# Patient Record
Sex: FEMALE | Race: WHITE | NOT HISPANIC OR LATINO | Employment: FULL TIME | URBAN - METROPOLITAN AREA
[De-identification: names, ages, dates, MRNs, and addresses within clinical notes are randomized per-mention and may not be internally consistent; named-entity substitution may affect disease eponyms.]

---

## 2017-08-14 ENCOUNTER — TRANSCRIBE ORDERS (OUTPATIENT)
Dept: ADMINISTRATIVE | Facility: HOSPITAL | Age: 42
End: 2017-08-14

## 2017-08-14 DIAGNOSIS — Z12.31 VISIT FOR SCREENING MAMMOGRAM: Primary | ICD-10-CM

## 2017-08-25 ENCOUNTER — APPOINTMENT (OUTPATIENT)
Dept: OTHER | Facility: HOSPITAL | Age: 42
End: 2017-08-25
Attending: OBSTETRICS & GYNECOLOGY

## 2017-08-25 ENCOUNTER — HOSPITAL ENCOUNTER (OUTPATIENT)
Dept: MAMMOGRAPHY | Facility: HOSPITAL | Age: 42
Discharge: HOME | End: 2017-08-25
Attending: OBSTETRICS & GYNECOLOGY | Admitting: OBSTETRICS & GYNECOLOGY

## 2017-08-25 DIAGNOSIS — Z12.31 VISIT FOR SCREENING MAMMOGRAM: ICD-10-CM

## 2017-08-25 DIAGNOSIS — Z92.89 HISTORY OF MAMMOGRAM: ICD-10-CM

## 2017-08-25 PROCEDURE — 77063 BREAST TOMOSYNTHESIS BI: CPT

## 2017-08-25 PROCEDURE — 77067 SCR MAMMO BI INCL CAD: CPT | Performed by: RADIOLOGY

## 2017-08-25 PROCEDURE — G0202 SCR MAMMO BI INCL CAD: HCPCS

## 2017-08-25 PROCEDURE — 77063 BREAST TOMOSYNTHESIS BI: CPT | Performed by: RADIOLOGY

## 2017-09-06 ENCOUNTER — CLINICAL SUPPORT (OUTPATIENT)
Dept: GENETICS | Facility: HOSPITAL | Age: 42
End: 2017-09-06

## 2017-09-06 ENCOUNTER — LAB (OUTPATIENT)
Dept: LAB | Facility: HOSPITAL | Age: 42
End: 2017-09-06

## 2017-09-06 DIAGNOSIS — Z80.42 FAMILY HISTORY OF PROSTATE CANCER: ICD-10-CM

## 2017-09-06 DIAGNOSIS — Z80.41 FAMILY HISTORY OF OVARIAN CANCER: ICD-10-CM

## 2017-09-06 DIAGNOSIS — Z80.3 FAMILY HISTORY OF BREAST CANCER: Primary | ICD-10-CM

## 2017-09-06 DIAGNOSIS — Z80.8 FAMILY HISTORY OF MELANOMA: ICD-10-CM

## 2017-09-06 DIAGNOSIS — R92.2 DENSE BREAST: Primary | ICD-10-CM

## 2017-09-06 NOTE — PROGRESS NOTES
Marah Millard is a 42 year old female who was referred for genetic counseling due to a family history of breast cancer. Ms. Millard has no personal history of cancer. She was 12 years old at menarche and had her first child at age 31. She retains her uterus and ovaries. Her current cancer screening includes annual clinical breast exam and annual mammogram. She was interested in discussing her risk for a hereditary cancer syndrome.  Ms. Millard was interested in pursuing a multi-gene panel, and therefore the CancerNext panel was ordered through [facility] [employer] which analyzes 34 genes associated with an increased cancer risk. The genes on this panel include APC, DANA, BARD1, BMPR1A, BRCA1, BRCA2, BRIP1, CDH1, CDK4, CDKN2A, CHEK2, DICER1, EPCAM, GREM1, HOXB13, MLH1, MRE11A, MSH2, MSH6, MUTYH, NBN, NF1, PALB2, PMS2, POLD1, POLE, PTEN, RAD50, RAD51C, RAD51D, SMAD4, SMARCA4, STK11, and TP53. Results from this testing are expected in approximately 2-3 weeks.    FAMILY HISTORY (see attached pedigree):    Mat. Grandmother:  Breast cancer, 50s  Mat. Great Grandmother: Ovarian cancer, 40s  Father:    Prostate cancer, 62  Mat. Grandfather:  Melanoma, 50s    We do not have medical records confirming the diagnoses in Ms. Millard’s family.    RISK ASSESSMENT:  Ms. Millard’s family history of breast cancer led to concern regarding a hereditary cancer syndrome.  She clearly meets NCCN guidelines criteria for BRCA1/2 testing based on family history. In cases where an affected relative is not available for testing or not willing to pursue testing, it is appropriate to offer testing to an unaffected individual.  Ms. Millard opted to pursue multigene panel testing via the CancerNext panel. If genetic testing is negative, Ms. Millard’s management should be guided by family history. These risk assessments are based on the family history information provided at the time of the appointment.  The assessments could change in the  future should new information be obtained.    GENETIC COUNSELING (30 minutes):  We reviewed the family history information in detail. Cases of cancer follow three general patterns: sporadic, familial, and hereditary.  While most cancer is sporadic, some cases appear to occur in family clusters.  These cases are said to be familial and account for 10-20% of cancer cases.  Familial cases may be due to a combination of shared genes and environmental factors among family members.  In even fewer families, the cancer is said to be inherited, and the genes responsible for the cancer are known.      Family histories typical of hereditary cancer syndromes usually include multiple first- and second-degree relatives diagnosed with cancer types that define a syndrome.  These cases tend to be diagnosed at younger-than-expected ages and can be bilateral or multifocal.  The cancer in these families follows an autosomal dominant inheritance pattern, which indicates the likely presence of a mutation in a cancer susceptibility gene.  Children and siblings of an individual believed to carry this mutation have a 50% chance of inheriting that mutation, thereby inheriting the increased risk to develop cancer.  These mutations can be passed down from the maternal or the paternal lineage.    Based on Ms. Millard’s family history of breast cancer, we discussed that hereditary breast cancer accounts for 5-10% of all cases of breast cancer.  A significant proportion of hereditary breast cancer can be attributed to mutations in the BRCA1 and BRCA2 genes.  Mutations in these genes confer an increased risk for breast cancer, ovarian cancer, male breast cancer, prostate cancer, and pancreatic cancer.  Women with a BRCA1 or BRCA2 mutation have up to an 87% lifetime risk of breast cancer and up to a 44% risk of ovarian cancer.     We discussed that there are other, more rare, hereditary cancer syndromes. Some of these conditions have well defined  cancer risks and established management guidelines.  Other genes that can be tested for have been more recently described, and there may be less data regarding the risks and therefore may not have established management guidelines.  We discussed these limitations at length. Based on Ms. Millard’s family history of breast cancer and her desire to get more information regarding her personal risks she opted to pursue testing through a panel evaluating several other genes known to increase the risk for cancer.    GENETIC TESTING:  The risks, benefits and limitations of genetic testing and implications for clinical management following testing were reviewed. DNA test results can influence decisions regarding screening and prevention.  Genetic testing can have significant psychological implications for both individuals and families. Also discussed was the possibility of employment and insurance discrimination based on genetic test results and the federal and states laws that are in place to prevent this.         We discussed panel testing, which would involve testing 34 genes associated with increased cancer risk. The benefits and limitations of genetic testing were discussed.  The implications of a positive or negative test result were discussed.  We also discussed the importance of testing on an affected relative. We discussed the possibility that, in some cases, genetic test results may be ambiguous due to the identification of a genetic variant. These variants may or may not be associated with an increased cancer risk. With multigene panel testing, it is not uncommon for a variant of uncertain significance (VUS) to be identified.  If a VUS is identified, testing family members is not recommended and screening recommendations are made based on the family history.  The laboratories that perform genetic testing work to reclassify the VUS and send out an amended report if and when a VUS is reclassified.  The majority of  variant findings are ultimately reclassified to a negative result. Given her family history, a negative test result does not eliminate all cancer risk, although the risk would not be as high as it would with positive genetic testing. We also discussed that some of the genes on this particular panel have not been well studied yet and there may not be clear implications or guidelines for some of the genes included on this comprehensive panel.    PLAN:  Genetic testing via the CancerNext panel through [facility] was ordered and results are expected in 2-3 weeks. We will contact Ms. Millard with her results once they are received.    Keren De La Garza MS  Genetic Counselor

## 2017-09-20 ENCOUNTER — DOCUMENTATION (OUTPATIENT)
Dept: GENETICS | Facility: HOSPITAL | Age: 42
End: 2017-09-20

## 2017-09-20 NOTE — PROGRESS NOTES
Marah Millard is a 42 year old female who was referred for genetic counseling due to a family history of breast cancer. Ms. Millard has no personal history of cancer. She was 12 years old at menarche and had her first child at age 31. She retains her uterus and ovaries. Her current cancer screening includes annual clinical breast exam and annual mammogram. She was interested in discussing her risk for a hereditary cancer syndrome.  Ms. Millard was interested in pursuing a multi-gene panel, and therefore the CancerNext panel was ordered through [facility] which analyzes 34 genes associated with an increased cancer risk. The genes on this panel include APC, DANA, BARD1, BMPR1A, BRCA1, BRCA2, BRIP1, CDH1, CDK4, CDKN2A, CHEK2, DICER1, EPCAM, GREM1, HOXB13, MLH1, MRE11A, MSH2, MSH6, MUTYH, NBN, NF1, PALB2, PMS2, POLD1, POLE, PTEN, RAD50, RAD51C, RAD51D, SMAD4, SMARCA4, STK11, and TP53. Genetic testing was negative by sequencing and rearrangement testing for deleterious mutations in the 34 genes included on the CancerNext panel (see attached results). These normal results were discussed with Ms. Millard by telephone on 9/19/17.    FAMILY HISTORY (see attached pedigree):    Mat. Grandmother:  Breast cancer, 50s  Mat. Great Grandmother: Ovarian cancer, 40s  Father:    Prostate cancer, 62  Pat. Grandfather:  Melanoma, 50s    We do not have medical records confirming the diagnoses in Ms. Millard’s family.    RISK ASSESSMENT:  Ms. Millard’s family history of breast cancer led to concern regarding a hereditary cancer syndrome.  She clearly meets NCCN guidelines criteria for BRCA1/2 testing based on family history. In cases where an affected relative is not available for testing or not willing to pursue testing, it is appropriate to offer testing to an unaffected individual.  Ms. Millard opted to pursue multigene panel testing via the CancerNext panel. If genetic testing is negative, Ms. Millard’s management should  be guided by family history.    This testing was negative for deleterious mutations in Ms. Millard, greatly reducing the likelihood for her to have a hereditary cancer syndrome. Based on computer modeling, Ms. Millard’s lifetime risk for breast cancer was estimated to be 16.9% (ERIKA), elevated over the general population risk of 12.5%.  Per NCCN guidelines, a risk greater than 20% is considered high risk and warrants increased screening, Ms. Millard’s risk does not fall into this category.  This risk assessment is based on the information that was provided during the session and may change if new information is obtained.    GENETIC COUNSELING (30 minutes):  We reviewed the family history information in detail. Cases of cancer follow three general patterns: sporadic, familial, and hereditary.  While most cancer is sporadic, some cases appear to occur in family clusters.  These cases are said to be familial and account for 10-20% of cancer cases.  Familial cases may be due to a combination of shared genes and environmental factors among family members.  In even fewer families, the cancer is said to be inherited, and the genes responsible for the cancer are known.      Family histories typical of hereditary cancer syndromes usually include multiple first- and second-degree relatives diagnosed with cancer types that define a syndrome.  These cases tend to be diagnosed at younger-than-expected ages and can be bilateral or multifocal.  The cancer in these families follows an autosomal dominant inheritance pattern, which indicates the likely presence of a mutation in a cancer susceptibility gene.  Children and siblings of an individual believed to carry this mutation have a 50% chance of inheriting that mutation, thereby inheriting the increased risk to develop cancer.  These mutations can be passed down from the maternal or the paternal lineage.    Based on Ms. Millard’s family history of breast cancer, we discussed  that hereditary breast cancer accounts for 5-10% of all cases of breast cancer.  A significant proportion of hereditary breast cancer can be attributed to mutations in the BRCA1 and BRCA2 genes.  Mutations in these genes confer an increased risk for breast cancer, ovarian cancer, male breast cancer, prostate cancer, and pancreatic cancer.  Women with a BRCA1 or BRCA2 mutation have up to an 87% lifetime risk of breast cancer and up to a 44% risk of ovarian cancer.     We discussed that there are other, more rare, hereditary cancer syndromes. Some of these conditions have well defined cancer risks and established management guidelines.  Other genes that can be tested for have been more recently described, and there may be less data regarding the risks and therefore may not have established management guidelines.  We discussed these limitations at length. Based on Ms. Millard’s family history of breast cancer and her desire to get more information regarding her personal risks she opted to pursue testing through a panel evaluating several other genes known to increase the risk for cancer.    GENETIC TESTING:  The risks, benefits and limitations of genetic testing and implications for clinical management following testing were reviewed. DNA test results can influence decisions regarding screening and prevention.  Genetic testing can have significant psychological implications for both individuals and families. Also discussed was the possibility of employment and insurance discrimination based on genetic test results and the federal and states laws that are in place to prevent this.         We discussed panel testing, which would involve testing 34 genes associated with increased cancer risk. The benefits and limitations of genetic testing were discussed.  The implications of a positive or negative test result were discussed.  We also discussed the importance of testing on an affected relative. We discussed the  possibility that, in some cases, genetic test results may be ambiguous due to the identification of a genetic variant. These variants may or may not be associated with an increased cancer risk. With multigene panel testing, it is not uncommon for a variant of uncertain significance (VUS) to be identified.  If a VUS is identified, testing family members is not recommended and screening recommendations are made based on the family history.  The laboratories that perform genetic testing work to reclassify the VUS and send out an amended report if and when a VUS is reclassified.  The majority of variant findings are ultimately reclassified to a negative result. Given her family history, a negative test result does not eliminate all cancer risk, although the risk would not be as high as it would with positive genetic testing. We also discussed that some of the genes on this particular panel have not been well studied yet and there may not be clear implications or guidelines for some of the genes included on this comprehensive panel.    TEST RESULTS:  Genetic testing was negative by sequencing and rearrangement testing for the 34 genes on this panel.  The negative result greatly lowers the risk of a hereditary cancer syndrome for Ms. Millard.  Since an affected individual in the family has not had testing, it is possible that the family history is due to a hereditary cancer syndrome which Ms. Millard did not happen to inherit. This assessment is based on the information provided at the time of the consultation.    CANCER PREVENTION:  Options available to individuals with an elevated lifetime risk for breast and/or ovarian cancer were briefly discussed, including increased surveillance, chemoprevention and prophylactic surgery (mastectomy and/or oophorectomy).  Based on computer modeling, Ms. Millard’s lifetime risk for breast cancer would not be considered “high risk”.  She should follow general population screening  guidelines for her breast cancer risk, including annual clinical breast exam, annual mammography, and monthly self-breast exam.      PLAN:  Genetic counseling remains available for Ms. Millard.  If Ms. Millard has any questions, concerns, or updates to the family history she is welcome to call us.      Keren De La Garza MS  Genetic Counselor    Cc: MD Marha Wilson

## 2018-06-11 ENCOUNTER — TRANSCRIBE ORDERS (OUTPATIENT)
Dept: ADMINISTRATIVE | Facility: HOSPITAL | Age: 43
End: 2018-06-11

## 2018-06-11 DIAGNOSIS — Z12.31 VISIT FOR SCREENING MAMMOGRAM: Primary | ICD-10-CM

## 2018-08-24 ENCOUNTER — TRANSCRIBE ORDERS (OUTPATIENT)
Dept: ADMINISTRATIVE | Facility: HOSPITAL | Age: 43
End: 2018-08-24

## 2018-08-24 DIAGNOSIS — R10.11 RUQ PAIN: Primary | ICD-10-CM

## 2018-08-29 ENCOUNTER — HOSPITAL ENCOUNTER (OUTPATIENT)
Dept: MAMMOGRAPHY | Facility: HOSPITAL | Age: 43
Discharge: HOME | End: 2018-08-29
Attending: OBSTETRICS & GYNECOLOGY | Admitting: OBSTETRICS & GYNECOLOGY

## 2018-08-29 DIAGNOSIS — Z12.31 VISIT FOR SCREENING MAMMOGRAM: ICD-10-CM

## 2018-08-29 PROCEDURE — 77067 SCR MAMMO BI INCL CAD: CPT | Performed by: RADIOLOGY

## 2018-08-29 PROCEDURE — 77063 BREAST TOMOSYNTHESIS BI: CPT | Performed by: RADIOLOGY

## 2018-08-29 PROCEDURE — 77067 SCR MAMMO BI INCL CAD: CPT

## 2018-08-29 PROCEDURE — 77063 BREAST TOMOSYNTHESIS BI: CPT

## 2018-08-30 ENCOUNTER — APPOINTMENT (OUTPATIENT)
Dept: NUCLEAR MEDICINE | Facility: HOSPITAL | Age: 43
End: 2018-08-30
Attending: INTERNAL MEDICINE

## 2018-09-04 ENCOUNTER — HOSPITAL ENCOUNTER (OUTPATIENT)
Dept: NUCLEAR MEDICINE | Facility: HOSPITAL | Age: 43
Discharge: HOME | End: 2018-09-04
Attending: INTERNAL MEDICINE

## 2018-09-04 DIAGNOSIS — R10.11 RUQ PAIN: ICD-10-CM

## 2018-09-04 PROCEDURE — A9537 TC99M MEBROFENIN: HCPCS | Performed by: INTERNAL MEDICINE

## 2018-09-04 PROCEDURE — 25010000002 SINCALIDE PER 5 MCG: Performed by: INTERNAL MEDICINE

## 2018-09-04 PROCEDURE — 0 TECHNETIUM TC 99M MEBROFENIN KIT: Performed by: INTERNAL MEDICINE

## 2018-09-04 PROCEDURE — 78227 HEPATOBIL SYST IMAGE W/DRUG: CPT

## 2018-09-04 RX ORDER — KIT FOR THE PREPARATION OF TECHNETIUM TC 99M MEBROFENIN 45 MG/10ML
1 INJECTION, POWDER, LYOPHILIZED, FOR SOLUTION INTRAVENOUS
Status: COMPLETED | OUTPATIENT
Start: 2018-09-04 | End: 2018-09-04

## 2018-09-04 RX ADMIN — SINCALIDE 1.2 MCG: 5 INJECTION, POWDER, LYOPHILIZED, FOR SOLUTION INTRAVENOUS at 11:52

## 2018-09-04 RX ADMIN — MEBROFENIN 1 DOSE: 45 INJECTION, POWDER, LYOPHILIZED, FOR SOLUTION INTRAVENOUS at 10:45

## 2018-09-05 ENCOUNTER — APPOINTMENT (OUTPATIENT)
Dept: NUCLEAR MEDICINE | Facility: HOSPITAL | Age: 43
End: 2018-09-05
Attending: INTERNAL MEDICINE

## 2018-09-06 ENCOUNTER — TRANSCRIBE ORDERS (OUTPATIENT)
Dept: ADMINISTRATIVE | Facility: HOSPITAL | Age: 43
End: 2018-09-06

## 2018-09-06 DIAGNOSIS — K83.8 DILATION OF BILIARY TRACT: Primary | ICD-10-CM

## 2018-09-20 ENCOUNTER — OFFICE (OUTPATIENT)
Dept: URBAN - METROPOLITAN AREA CLINIC 4 | Facility: CLINIC | Age: 43
End: 2018-09-20

## 2018-09-20 VITALS — WEIGHT: 134 LBS | SYSTOLIC BLOOD PRESSURE: 104 MMHG | DIASTOLIC BLOOD PRESSURE: 68 MMHG | HEIGHT: 65 IN

## 2018-09-20 DIAGNOSIS — A04.9 BACTERIAL INTESTINAL INFECTION, UNSPECIFIED: ICD-10-CM

## 2018-09-20 DIAGNOSIS — R10.32 LEFT LOWER QUADRANT PAIN: ICD-10-CM

## 2018-09-20 PROCEDURE — 99244 OFF/OP CNSLTJ NEW/EST MOD 40: CPT | Performed by: INTERNAL MEDICINE

## 2019-08-13 ENCOUNTER — TRANSCRIBE ORDERS (OUTPATIENT)
Dept: ADMINISTRATIVE | Facility: HOSPITAL | Age: 44
End: 2019-08-13

## 2019-08-13 DIAGNOSIS — Z12.31 VISIT FOR SCREENING MAMMOGRAM: Primary | ICD-10-CM

## 2019-10-02 ENCOUNTER — HOSPITAL ENCOUNTER (OUTPATIENT)
Dept: MAMMOGRAPHY | Facility: HOSPITAL | Age: 44
Discharge: HOME | End: 2019-10-02
Admitting: OBSTETRICS & GYNECOLOGY

## 2019-10-02 DIAGNOSIS — Z12.31 VISIT FOR SCREENING MAMMOGRAM: ICD-10-CM

## 2019-10-02 PROCEDURE — 77063 BREAST TOMOSYNTHESIS BI: CPT | Performed by: RADIOLOGY

## 2019-10-02 PROCEDURE — 77067 SCR MAMMO BI INCL CAD: CPT

## 2019-10-02 PROCEDURE — 77067 SCR MAMMO BI INCL CAD: CPT | Performed by: RADIOLOGY

## 2019-10-02 PROCEDURE — 77063 BREAST TOMOSYNTHESIS BI: CPT

## 2020-09-16 RX ORDER — CITALOPRAM 20 MG/1
TABLET ORAL
Qty: 90 TABLET | Refills: 2 | Status: SHIPPED | OUTPATIENT
Start: 2020-09-16 | End: 2021-07-09 | Stop reason: SDUPTHER

## 2021-03-19 ENCOUNTER — IMMUNIZATION (OUTPATIENT)
Dept: VACCINE CLINIC | Facility: HOSPITAL | Age: 46
End: 2021-03-19

## 2021-03-19 PROCEDURE — 0001A: CPT | Performed by: INTERNAL MEDICINE

## 2021-03-19 PROCEDURE — 91300 HC SARSCOV02 VAC 30MCG/0.3ML IM: CPT | Performed by: INTERNAL MEDICINE

## 2021-04-13 ENCOUNTER — IMMUNIZATION (OUTPATIENT)
Dept: VACCINE CLINIC | Facility: HOSPITAL | Age: 46
End: 2021-04-13

## 2021-04-13 PROCEDURE — 0002A: CPT | Performed by: INTERNAL MEDICINE

## 2021-04-13 PROCEDURE — 91300 HC SARSCOV02 VAC 30MCG/0.3ML IM: CPT | Performed by: INTERNAL MEDICINE

## 2021-05-12 ENCOUNTER — TELEPHONE (OUTPATIENT)
Dept: OBSTETRICS AND GYNECOLOGY | Facility: CLINIC | Age: 46
End: 2021-05-12

## 2021-05-12 DIAGNOSIS — R92.8 ABNORMAL MAMMOGRAM: Primary | ICD-10-CM

## 2021-05-12 NOTE — TELEPHONE ENCOUNTER
Per Olivia at [facility], an order needed for bilateral breast MRI without contrast as follow up to 4/27/21 imaging performed at their facility.    Diagnosis: implant integrity and rule out silicone rupture    PA needed.    Fax order and PA to Olivia at [phone]

## 2021-05-12 NOTE — TELEPHONE ENCOUNTER
MRI ORDER FOR BREAST - SPECIALITY DUE TO IMPLANTS AND THIS IS A FOLLOW UP OF A FOLLOW UP    [facility]    [phone]    BERRY - CONTACT INFO WITH QUESTIONS

## 2021-05-27 DIAGNOSIS — R92.8 ABNORMAL MAMMOGRAM: ICD-10-CM

## 2021-07-07 RX ORDER — CITALOPRAM 20 MG/1
TABLET ORAL
Qty: 90 TABLET | Refills: 2 | OUTPATIENT
Start: 2021-07-07

## 2021-07-08 ENCOUNTER — TELEPHONE (OUTPATIENT)
Dept: OBSTETRICS AND GYNECOLOGY | Facility: CLINIC | Age: 46
End: 2021-07-08

## 2021-07-08 NOTE — TELEPHONE ENCOUNTER
We rescheduled her to Friday with an US - she is due for her annual. She is having back pain and has an IUD. CCUA and US and apt Friday with Dr. Morin

## 2021-07-09 ENCOUNTER — OFFICE VISIT (OUTPATIENT)
Dept: OBSTETRICS AND GYNECOLOGY | Facility: CLINIC | Age: 46
End: 2021-07-09

## 2021-07-09 VITALS
BODY MASS INDEX: 21.66 KG/M2 | SYSTOLIC BLOOD PRESSURE: 100 MMHG | DIASTOLIC BLOOD PRESSURE: 80 MMHG | HEIGHT: 65 IN | WEIGHT: 130 LBS

## 2021-07-09 DIAGNOSIS — Z30.431 IUD CHECK UP: ICD-10-CM

## 2021-07-09 DIAGNOSIS — Z01.419 WOMEN'S ANNUAL ROUTINE GYNECOLOGICAL EXAMINATION: ICD-10-CM

## 2021-07-09 DIAGNOSIS — R10.2 PELVIC PAIN: Primary | ICD-10-CM

## 2021-07-09 PROCEDURE — 99396 PREV VISIT EST AGE 40-64: CPT | Performed by: OBSTETRICS & GYNECOLOGY

## 2021-07-09 RX ORDER — CITALOPRAM 20 MG/1
20 TABLET ORAL DAILY
Qty: 90 TABLET | Refills: 3 | Status: SHIPPED | OUTPATIENT
Start: 2021-07-09 | End: 2022-09-20 | Stop reason: SDUPTHER

## 2021-07-09 NOTE — PROGRESS NOTES
GYN Annual Exam     CC - Here for annual exam.     Subjective   HPI  Marah Millard is a 46 y.o. female, , who presents for annual well woman exam.  Her last LMP was No LMP recorded. Patient has had an implant.. Patient reports problems with: abnormal bleeding.  The patient uses 1 of tampons/pads per hour..  Partner Status: Marital Status: .  New Partners since last visit: YES/NO/Other: no.  Desires STD Screening:  No. Patient states that there is pain on her right side. She is also having back pain as well as request urination. Patient denies any discharge with foul odor.   Additional OB/GYN History   Current contraception: IUD  Last Pap :   Last Completed Pap Smear          Ordered - PAP SMEAR (Every 3 Years) Ordered on 2021    10/02/2019  Done - negative              History of abnormal Pap smear: yes  Family history of uterine, colon, breast, or ovarian cancer: yes  Performs monthly Self-Breast Exam: yes  Last mammogram:   Last Completed Mammogram     This patient has no relevant Health Maintenance data.        Feelings of Anxiety or Depression:NO  Tobacco Usage?: No   OB History        2    Para   1    Term   0       1    AB   1    Living   1       SAB   0    TAB   0    Ectopic   0    Molar   0    Multiple   0    Live Births   1                Health Maintenance   Topic Date Due   • Annual Gynecologic Pelvic and Breast Exam  Never done   • COLORECTAL CANCER SCREENING  Never done   • ANNUAL PHYSICAL  Never done   • HEPATITIS C SCREENING  Never done   • INFLUENZA VACCINE  2021   • PAP SMEAR  10/02/2022   • TDAP/TD VACCINES (2 - Td or Tdap) 2026   • COVID-19 Vaccine  Completed   • Pneumococcal Vaccine 0-64  Aged Out         Current Outpatient Medications:   •  citalopram (CeleXA) 20 MG tablet, Take 1 tablet by mouth Daily., Disp: 90 tablet, Rfl: 3    The additional following portions of the patient's history were reviewed and updated as appropriate:  "allergies, current medications, past family history, past medical history, past social history, past surgical history and problem list.    Review of Systems   Constitutional: Negative.    HENT: Negative.    Eyes: Negative.    Respiratory: Negative.    Cardiovascular: Negative.    Gastrointestinal: Negative.    Endocrine: Negative.    Genitourinary: Positive for pelvic pain, pelvic pressure and urgency.   Musculoskeletal: Positive for back pain.   Skin: Negative.    Allergic/Immunologic: Negative.    Neurological: Negative.    Hematological: Negative.    Psychiatric/Behavioral: Negative.        I have reviewed and agree with the HPI, ROS, and historical information as entered above. Orlando Morin MD    Objective   /80   Ht 165.1 cm (65\")   Wt 59 kg (130 lb)   BMI 21.63 kg/m²     PE    Breast: Without masses ,nontender, no skin changes or retractions  Axilla: Normal, no lymphadenopathy  Heart: Regular rate no murmurs rubs or gallops  Lungs: Clear to auscultation, normal breath sounds bilaterally  Abdomen: Soft nontender, no hepatosplenomegaly, no guarding or rebound, no masses  Pelvic exam  External genitalia: Normal introitus and vulva  Vagina: Normal mucosa no bleeding inflammation or discharge  Bladder: Normal position nontender  Urethral meatus and urethra: Normal nontender  Cervix: No lesions, no discharge, bleeding or inflammation, IUD string visible  Bimanual: Nontender adnexa clear, no sign of uterine or ovarian enlargement  Anal: No external lesions or hemorrhoids       Assessment/Plan     Assessment     Problem List Items Addressed This Visit        Gastrointestinal Abdominal     Pelvic pain - Primary    Relevant Orders    US Non-ob Transvaginal    Pap IG, HPV-hr       Genitourinary and Reproductive     IUD check up    Women's annual routine gynecological examination    Relevant Orders    Pap IG, HPV-hr          1. GYN annual well woman exam.   2. IUD check    Plan     1. Reviewed HPV " guidelines  2. Reviewed monthly self breast exams.  Instructed to call with lumps, pain, or breast discharge.  Yearly mammograms ordered.  3. Anticipatory menopausal guidance given.    Health education reviewed, printed information available    Orlando Morin MD  07/09/2021

## 2022-07-14 ENCOUNTER — OFFICE VISIT (OUTPATIENT)
Dept: OBSTETRICS AND GYNECOLOGY | Facility: CLINIC | Age: 47
End: 2022-07-14

## 2022-07-14 VITALS
SYSTOLIC BLOOD PRESSURE: 105 MMHG | DIASTOLIC BLOOD PRESSURE: 65 MMHG | HEIGHT: 65 IN | WEIGHT: 135.2 LBS | BODY MASS INDEX: 22.53 KG/M2

## 2022-07-14 DIAGNOSIS — Z12.31 BREAST CANCER SCREENING BY MAMMOGRAM: ICD-10-CM

## 2022-07-14 DIAGNOSIS — Z01.419 ENCOUNTER FOR GYNECOLOGICAL EXAMINATION WITHOUT ABNORMAL FINDING: Primary | ICD-10-CM

## 2022-07-14 DIAGNOSIS — Z80.3 FAMILY HISTORY OF BREAST CANCER: ICD-10-CM

## 2022-07-14 DIAGNOSIS — Z12.11 COLON CANCER SCREENING: ICD-10-CM

## 2022-07-14 PROCEDURE — 99396 PREV VISIT EST AGE 40-64: CPT | Performed by: OBSTETRICS & GYNECOLOGY

## 2022-07-14 NOTE — PROGRESS NOTES
Gynecologic Annual Exam Note          CC - Here for annual exam.     Subjective     HPI  Marah Millard is a 47 y.o. female, , who presents for annual well woman exam.  She is perimenopausal.   No LMP recorded (lmp unknown). Patient has had an implant.  Periods are absent  secondary to IUD. Patient reports problems with: hot flashes at night, vaginal dryness.  Partner Status: Marital Status: single.  New Partners since last visit: no.  The patient Reports vasomotor symptoms.     The patient has any complaints today. Needs order for colonoscopy.    She exercises regularly: no.  She has concerns about domestic violence: no.      Additional OB/GYN History   Current contraception: contraceptive methods: IUD.  Insertion date: Mirena-2019  Desires to: continue contraception  History of abnormal Pap smear: yes - led to LEEP  Family history of uterine, colon, breast, or ovarian cancer: yes - breast-mother and M Grandmother, ovarian-maternal great grandmother   Performs monthly Self-Breast Exam: yes  Feelings of Anxiety or Depression: no    Last Pap : 21-neg, HPV neg   Last Completed Pap Smear          Ordered - PAP SMEAR (Every 3 Years) Ordered on 2021  SCANNED - PAP SMEAR    10/02/2019  Done - negative                Last mammogram: 22-benign   Last Completed Mammogram          MAMMOGRAM (Yearly) Next due on 2022  SCANNED - MAMMO    2021  MRI Breast Bilateral Without Contrast    2021  SCANNED - MAMMO    2021  SCANNED - MAMMO    10/27/2020  SCANNED - MAMMO    Only the first 5 history entries have been loaded, but more history exists.                Last colonoscopy: never   Last Completed Colonoscopy     This patient has no relevant Health Maintenance data.            Tobacco Usage?: No   OB History        2    Para   1    Term   0       1    AB   1    Living   1       SAB   0    IAB   0    Ectopic   0    Molar   0     "Multiple   0    Live Births   1                  The additional following portions of the patient's history were reviewed and updated as appropriate: allergies, current medications, past family history, past medical history, past social history, past surgical history and problem list.    Past Medical History:   Diagnosis Date   • Abnormal Pap smear of cervix    • H/O diagnostic mammography    • History of abnormal cervical Papanicolaou smear 2003    moderate dysplasia   • History of sebaceous cyst    • IUD (intrauterine device) in place 06/2019    Mirena        Past Surgical History:   Procedure Laterality Date   • AUGMENTATION MAMMAPLASTY Bilateral 11/2017    TEXTURED; Replaced implants from 2000   • BREAST CYST EXCISION      drained first by [facility], then removed by dermatologist Dr. Vazquez   • BREAST SURGERY  2021    revision of implants   • COLPOSCOPY W/ BIOPSY / CURETTAGE     • LEEP  09/2003    moderate dysplasia; margins clear   • MOLE REMOVAL     • TOE SURGERY  2018    bunyom removal    • URETHRAL DILATATION          Review of Systems   Constitutional: Negative.    HENT: Negative.    Eyes: Negative.    Respiratory: Negative.    Cardiovascular: Negative.    Gastrointestinal: Negative.    Endocrine: Positive for heat intolerance (hot flashes).   Genitourinary:        Vaginal dryness   Musculoskeletal: Negative.    Skin: Negative.    Allergic/Immunologic: Negative.    Neurological: Negative.    Hematological: Negative.    Psychiatric/Behavioral: Negative.        I have reviewed and agree with the HPI, ROS, and historical information as entered above. Lety Davenport MD    Objective   /65   Ht 165.1 cm (65\")   Wt 61.3 kg (135 lb 3.2 oz)   LMP  (LMP Unknown)   BMI 22.50 kg/m²     Physical Exam  Vitals and nursing note reviewed. Exam conducted with a chaperone present.   Constitutional:       General: She is awake.   HENT:      Head: Normocephalic and atraumatic.   Neck:      Thyroid: No thyroid mass, " thyromegaly or thyroid tenderness.   Cardiovascular:      Rate and Rhythm: Normal rate.      Heart sounds: No murmur heard.    No friction rub. No gallop.   Pulmonary:      Effort: Pulmonary effort is normal.      Breath sounds: Normal breath sounds. No stridor. No wheezing, rhonchi or rales.   Chest:      Chest wall: No mass or tenderness.   Breasts:      Right: Normal. No bleeding, inverted nipple, mass, nipple discharge, skin change, tenderness, axillary adenopathy or supraclavicular adenopathy.      Left: Normal. No bleeding, inverted nipple, mass, nipple discharge, skin change, tenderness, axillary adenopathy or supraclavicular adenopathy.        Comments: Implants present bilaterally  Abdominal:      Palpations: Abdomen is soft.      Tenderness: There is no guarding or rebound.      Hernia: There is no hernia in the left inguinal area or right inguinal area.   Genitourinary:     General: Normal vulva.      Pubic Area: No rash.       Labia:         Right: No rash, tenderness, lesion or injury.         Left: No rash, tenderness, lesion or injury.       Urethra: No prolapse, urethral swelling or urethral lesion.      Vagina: No foreign body. No vaginal discharge, erythema, tenderness, bleeding or lesions.      Cervix: No cervical motion tenderness, discharge, friability, lesion, erythema or cervical bleeding.      Uterus: Normal. Not deviated, not enlarged, not fixed and not tender.       Adnexa: Right adnexa normal and left adnexa normal.        Right: No mass, tenderness or fullness.          Left: No mass, tenderness or fullness.        Rectum: No external hemorrhoid.   Musculoskeletal:      Cervical back: Normal range of motion.   Lymphadenopathy:      Upper Body:      Right upper body: No supraclavicular or axillary adenopathy.      Left upper body: No supraclavicular or axillary adenopathy.   Skin:     General: Skin is warm.   Neurological:      Mental Status: She is alert and oriented to person, place, and  time.   Psychiatric:         Mood and Affect: Mood and affect normal.         Speech: Speech normal.         Assessment & Plan     Assessment     Problem List Items Addressed This Visit        Family History    Family history of breast cancer    Overview     Mother and Maternal Grandma with Breast CA  In their 60's  Maternal Great Grandma with Ovarian CA    PT had genetic testing, BRCA Negative             Other Visit Diagnoses     Encounter for gynecological examination without abnormal finding    -  Primary    Relevant Orders    LIQUID-BASED PAP SMEAR, [facility] ([facility])    Breast cancer screening by mammogram        Colon cancer screening        Relevant Orders    Ambulatory Referral For Screening Colonoscopy (Completed)          Plan     Reviewed monthly self breast exams.  Instructed to call with lumps, pain, or breast discharge.  Yearly mammograms ordered.  Ordered mammogram today.  Symptoms of menopausal transition reviewed with patient.   RTC in 1 year or PRN with problems.  No IUD string was visualized or palpated on exam.  A bedside ultrasound was performed which revealed the IUD in proper fundal location.    Lety Davenport MD  07/14/2022

## 2022-07-19 LAB — REF LAB TEST METHOD: NORMAL

## 2022-09-20 RX ORDER — CITALOPRAM 20 MG/1
20 TABLET ORAL DAILY
Qty: 90 TABLET | Refills: 3 | Status: SHIPPED | OUTPATIENT
Start: 2022-09-20

## 2023-07-27 ENCOUNTER — OFFICE VISIT (OUTPATIENT)
Dept: OBSTETRICS AND GYNECOLOGY | Facility: CLINIC | Age: 48
End: 2023-07-27
Payer: COMMERCIAL

## 2023-07-27 VITALS
WEIGHT: 134.2 LBS | BODY MASS INDEX: 21.57 KG/M2 | SYSTOLIC BLOOD PRESSURE: 110 MMHG | HEIGHT: 66 IN | DIASTOLIC BLOOD PRESSURE: 70 MMHG

## 2023-07-27 DIAGNOSIS — Z12.31 BREAST CANCER SCREENING BY MAMMOGRAM: ICD-10-CM

## 2023-07-27 DIAGNOSIS — Z80.3 FAMILY HISTORY OF BREAST CANCER: ICD-10-CM

## 2023-07-27 DIAGNOSIS — Z91.89 INCREASED RISK OF BREAST CANCER: ICD-10-CM

## 2023-07-27 DIAGNOSIS — Z01.419 ENCOUNTER FOR GYNECOLOGICAL EXAMINATION WITHOUT ABNORMAL FINDING: Primary | ICD-10-CM

## 2023-07-27 DIAGNOSIS — N95.2 VAGINAL ATROPHY: ICD-10-CM

## 2023-07-27 RX ORDER — ESTRADIOL 0.1 MG/G
CREAM VAGINAL
Qty: 42.5 G | Refills: 2 | Status: SHIPPED | OUTPATIENT
Start: 2023-07-27

## 2023-07-27 NOTE — PROGRESS NOTES
Gynecologic Annual Exam Note          CC - Here for annual exam.     Subjective     HPI  Marah Millard is a 48 y.o. female, , who presents for annual well woman exam.  She is not getting periods because if IUD, just spotting occasionally.   No LMP recorded (lmp unknown). Patient has had an implant. She has occasional cramping when she has the spotting.      Patient reports problems with: Patient is on Celexa for depression and that does well to control it. Patient reports decreased libido for the last six. She reports that she has had vaginal dryness for one year. She has an increased temperature at night but not what she would describe as a hot flash for about six to eight months.   Partner Status:single .  New Partners since last visit:no .  The patient has maybe vasomotor symptoms with feeling a little warm at night.     The patient has any complaints today. See above.    She exercises regularly: yes.  She has concerns about domestic violence: no.      Additional OB/GYN History   Current contraception: contraceptive methods: IUD.  Insertion date:   Desires to: continue contraception  History of abnormal Pap smear: yes - leep  Family history of uterine, colon, breast, or ovarian cancer: yes - mother and maternal grandmother had breast cancer. Maternal great grandmother had ovarian cancer.   Performs monthly Self-Breast Exam: yes  Feelings of Anxiety or Depression: no    Last Pap : 22 negative  Last Completed Pap Smear            PAP SMEAR (Every 3 Years) Next due on 2022  LIQUID-BASED PAP SMEAR, [facility] ([facility])    2021  SCANNED - PAP SMEAR    10/02/2019  Done - negative                    Last mammogram: 23 [facility]  Last Completed Mammogram            Ordered - MAMMOGRAM (Yearly) Ordered on 2023  Outside Procedure: HC MAMMOGRAM SCREENING BILAT DIGITAL W CAD    2022  SCANNED - MAMMO    2021  SCANNED - MAMMO     2021  SCANNED - MAMMO    10/27/2020  SCANNED - MAMMO    Only the first 5 history entries have been loaded, but more history exists.                    Last colonoscopy:   Last Completed Colonoscopy       This patient has no relevant Health Maintenance data.              Tobacco Usage?: No   OB History          2    Para   1    Term   0       1    AB   1    Living   1         SAB   0    IAB   0    Ectopic   0    Molar   0    Multiple   0    Live Births   1                  The additional following portions of the patient's history were reviewed and updated as appropriate: allergies, current medications, past family history, past medical history, past social history, past surgical history, and problem list.    Past Medical History:   Diagnosis Date    Abnormal Pap smear of cervix     H/O diagnostic mammography     History of abnormal cervical Papanicolaou smear     moderate dysplasia    History of sebaceous cyst     IUD (intrauterine device) in place 2019    Mirena        Past Surgical History:   Procedure Laterality Date    AUGMENTATION MAMMAPLASTY Bilateral 2017    TEXTURED; Replaced implants from     BREAST CYST EXCISION      drained first by [facility], then removed by dermatologist Dr. Vazquez    BREAST SURGERY      revision of implants    COLPOSCOPY W/ BIOPSY / CURETTAGE      LEEP  2003    moderate dysplasia; margins clear    MOLE REMOVAL      TOE SURGERY  2018    bunyom removal     URETHRAL DILATATION          Review of Systems   Constitutional: Negative.    HENT: Negative.     Eyes: Negative.    Respiratory: Negative.     Cardiovascular: Negative.    Gastrointestinal: Negative.    Endocrine: Positive for heat intolerance.   Genitourinary:  Positive for decreased libido.        Vaginal dryness   Musculoskeletal: Negative.    Skin: Negative.    Allergic/Immunologic: Negative.    Neurological: Negative.    Hematological: Negative.    Psychiatric/Behavioral: Negative.    "    I have reviewed and agree with the HPI, ROS, and historical information as entered above. Lety Davenport MD    Objective   /70 (BP Location: Left arm, Patient Position: Sitting, Cuff Size: Adult)   Ht 166.4 cm (65.5\")   Wt 60.9 kg (134 lb 3.2 oz)   LMP  (LMP Unknown)   BMI 21.99 kg/m²     Physical Exam  Vitals and nursing note reviewed. Exam conducted with a chaperone present.   Constitutional:       General: She is awake.   HENT:      Head: Normocephalic and atraumatic.   Neck:      Thyroid: No thyroid mass, thyromegaly or thyroid tenderness.   Cardiovascular:      Rate and Rhythm: Normal rate.      Heart sounds: No murmur heard.    No friction rub. No gallop.   Pulmonary:      Effort: Pulmonary effort is normal.      Breath sounds: Normal breath sounds. No stridor. No wheezing, rhonchi or rales.   Chest:      Chest wall: No mass or tenderness.   Breasts:     Right: Normal. No bleeding, inverted nipple, mass, nipple discharge, skin change or tenderness.      Left: Normal. No bleeding, inverted nipple, mass, nipple discharge, skin change or tenderness.      Comments: Implants present bilaterally  Abdominal:      Palpations: Abdomen is soft.      Tenderness: There is no guarding or rebound.      Hernia: There is no hernia in the left inguinal area or right inguinal area.   Genitourinary:     General: Normal vulva.      Pubic Area: No rash.       Labia:         Right: No rash, tenderness, lesion or injury.         Left: No rash, tenderness, lesion or injury.       Urethra: No prolapse, urethral swelling or urethral lesion.      Vagina: No foreign body. No vaginal discharge, erythema, tenderness, bleeding or lesions.      Cervix: No cervical motion tenderness, discharge, friability, lesion, erythema or cervical bleeding.      Uterus: Normal. Not deviated, not enlarged, not fixed and not tender.       Adnexa: Right adnexa normal and left adnexa normal.        Right: No mass, tenderness or fullness.         "  Left: No mass, tenderness or fullness.        Rectum: No external hemorrhoid.   Musculoskeletal:      Cervical back: Normal range of motion.   Lymphadenopathy:      Upper Body:      Right upper body: No supraclavicular or axillary adenopathy.      Left upper body: No supraclavicular or axillary adenopathy.   Skin:     General: Skin is warm.   Neurological:      Mental Status: She is alert and oriented to person, place, and time.   Psychiatric:         Mood and Affect: Mood and affect normal.         Speech: Speech normal.       Assessment & Plan     Assessment     Problem List Items Addressed This Visit          Family History    Family history of breast cancer    Overview     Mother and Maternal Grandma with Breast CA  In their 60's  Maternal Great Grandma with Ovarian CA    PT had genetic testing, BRCA Negative    Pt's lifetime risk caculated to be 16.9%---Intermediate Risk          Other Visit Diagnoses       Encounter for gynecological examination without abnormal finding    -  Primary    Breast cancer screening by mammogram        Relevant Orders    Mammo Screening Digital Tomosynthesis Bilateral With CAD    Vaginal atrophy        Relevant Medications    estradiol (ESTRACE VAGINAL) 0.1 MG/GM vaginal cream    Increased risk of breast cancer        Relevant Orders    MRI Breast Bilateral Screening With & Without Contrast            Plan     Reviewed monthly self breast exams.  Instructed to call with lumps, pain, or breast discharge.  Yearly mammograms ordered.  Ordered mammogram today.  RTC in 1 year or PRN with problems.  IUD string not visualized, u/s last year showed proper placement.  Patient with strong family history of breast cancer.  Patient has previously had negative genetic testing and was noted to have intermediate risk for breast cancer.  We discussed additional imaging with annual mammogram as well as annual MRI.  This was ordered today.    Lety Davenport MD  07/27/2023

## 2023-08-14 ENCOUNTER — TELEPHONE (OUTPATIENT)
Dept: OBSTETRICS AND GYNECOLOGY | Facility: CLINIC | Age: 48
End: 2023-08-14
Payer: COMMERCIAL

## 2023-08-14 NOTE — TELEPHONE ENCOUNTER
PT IS REQUESTING AN UPDATE IF PRIOR AUTHORIZATION FOR HER BREAST MRI HAS BEEN COMPLETED, PLEASE ADVISE PT.

## 2023-10-02 DIAGNOSIS — F32.A DEPRESSION, UNSPECIFIED DEPRESSION TYPE: Primary | ICD-10-CM

## 2023-10-03 RX ORDER — CITALOPRAM 20 MG/1
TABLET ORAL
Qty: 90 TABLET | Refills: 3 | Status: SHIPPED | OUTPATIENT
Start: 2023-10-03

## 2023-10-04 ENCOUNTER — TELEPHONE (OUTPATIENT)
Dept: OBSTETRICS AND GYNECOLOGY | Facility: CLINIC | Age: 48
End: 2023-10-04

## 2023-10-04 NOTE — TELEPHONE ENCOUNTER
Caller: Marah iMllard    Relationship: Self    Best call back number: [phone]    What is the best time to reach you: ANYTIME    Who are you requesting to speak with (clinical staff, provider,  specific staff member): CLINICAL STAFF      What was the call regarding: NOT SURE MISSED A CALL YESTERDAY AND NO VM . WAS CALLING BACK TO SEE WHO CALLED HER. PLEASE ADVISES PT .

## 2023-10-04 NOTE — TELEPHONE ENCOUNTER
Returned patient's call. Nothing in chart indicates we attempted to reach patient. If someone did need to reach her, they should try again. She v/u.

## 2024-08-23 ENCOUNTER — OFFICE VISIT (OUTPATIENT)
Dept: OBSTETRICS AND GYNECOLOGY | Facility: CLINIC | Age: 49
End: 2024-08-23
Payer: COMMERCIAL

## 2024-08-23 VITALS
DIASTOLIC BLOOD PRESSURE: 60 MMHG | SYSTOLIC BLOOD PRESSURE: 102 MMHG | HEIGHT: 66 IN | BODY MASS INDEX: 21.21 KG/M2 | WEIGHT: 132 LBS

## 2024-08-23 DIAGNOSIS — N95.2 VAGINAL ATROPHY: ICD-10-CM

## 2024-08-23 DIAGNOSIS — Z01.419 ROUTINE GYNECOLOGICAL EXAMINATION: Primary | ICD-10-CM

## 2024-08-23 RX ORDER — ESCITALOPRAM OXALATE 20 MG/1
1 TABLET ORAL DAILY
COMMUNITY
Start: 2024-06-27

## 2024-08-23 RX ORDER — ESTRADIOL 0.1 MG/G
CREAM VAGINAL
Qty: 42.5 G | Refills: 2 | Status: SHIPPED | OUTPATIENT
Start: 2024-08-23

## 2024-08-23 NOTE — PROGRESS NOTES
Gynecologic Annual Exam Note          GYN Annual Exam     Gynecologic Exam        Subjective     HPI  Marah Millard is a 49 y.o. female, , who presents for annual well woman exam as an established patient. There were no changes to her medical or surgical history since her last visit..  No LMP recorded (lmp unknown). Patient has had an implant.  Her periods are absent secondary to birth control. Marital Status: single. She is sexually active. She has not had new partners.. STD testing recommendations have been explained to the patient and she declines STD testing.    The patient would like to discuss the following complaints today: none    Additional OB/GYN History   contraceptive methods: IUD.  Insertion date: Mirena 2019  Desires to: continue contraception  History of migraines: no    Last Pap : 22. Result: negative. HPV:  not done .   Last Completed Pap Smear            Ordered - PAP SMEAR (Every 3 Years) Ordered on 2022  LIQUID-BASED PAP SMEAR, [facility] ([facility])    2021  SCANNED - PAP SMEAR    10/02/2019  Done - negative                  History of abnormal Pap smear: yes - HPV+  Family history of uterine, colon, breast, or ovarian cancer: yes - mom had breast cancer, maternal grandmother had breast cancer  Performs monthly Self-Breast Exam: yes  Last mammogram: 24. Done at [facility]. There is a copy in the chart.    Last Completed Mammogram            MAMMOGRAM (Yearly) Next due on 2024  Outside Procedure: HC MAMMOGRAM SCREENING BILAT DIGITAL W CAD    2023  Outside Procedure: HC MAMMOGRAM SCREENING BILAT DIGITAL W CAD    2022  SCANNED - MAMMO    2021  SCANNED - MAMMO    2021  SCANNED - MAMMO    Only the first 5 history entries have been loaded, but more history exists.                    Colonoscopy: has had a colonoscopy 2 yrs ago wnl  Exercises Regularly: no  Feelings of Anxiety or Depression: yes -  anxiety  Tobacco Usage?: No       Current Outpatient Medications:     escitalopram (LEXAPRO) 20 MG tablet, Take 1 tablet by mouth Daily., Disp: , Rfl:     estradiol (ESTRACE VAGINAL) 0.1 MG/GM vaginal cream, Insert 2 gram PV QHS x 2 weeks, then insert 2 grams PV twice weekly for duration of use, Disp: 42.5 g, Rfl: 2    Levonorgestrel (MIRENA) 20 MCG/DAY intrauterine device IUD, To be inserted one time by prescriber. Route intrauterine., Disp: , Rfl:      Patient denies the need for medication refills today.    OB History          2    Para   1    Term   0       1    AB   1    Living   1         SAB   0    IAB   0    Ectopic   0    Molar   0    Multiple   0    Live Births   1                Past Medical History:   Diagnosis Date    Abnormal Pap smear of cervix     Anxiety     H/O diagnostic mammography     History of abnormal cervical Papanicolaou smear     moderate dysplasia    History of sebaceous cyst     IUD (intrauterine device) in place 2019    Mirena        Past Surgical History:   Procedure Laterality Date    AUGMENTATION MAMMAPLASTY Bilateral 2017    TEXTURED; Replaced implants from     BREAST CYST EXCISION      drained first by [facility], then removed by dermatologist Dr. Vazquez    BREAST SURGERY      revision of implants    COLPOSCOPY W/ BIOPSY / CURETTAGE      LEEP  2003    moderate dysplasia; margins clear    MOLE REMOVAL      TOE SURGERY      bunyom removal     URETHRAL DILATATION         Health Maintenance   Topic Date Due    COLORECTAL CANCER SCREENING  Never done    HEPATITIS C SCREENING  Never done    ANNUAL PHYSICAL  Never done    COVID-19 Vaccine (2023- season) 2023    Annual Gynecologic Pelvic and Breast Exam  2024    INFLUENZA VACCINE  2024    MAMMOGRAM  2025    PAP SMEAR  2025    TDAP/TD VACCINES (2 - Td or Tdap) 2026    Pneumococcal Vaccine 0-64  Aged Out       The additional following portions of the patient's  "history were reviewed and updated as appropriate: allergies, current medications, past family history, past medical history, past social history, past surgical history, and problem list.    Review of Systems   Constitutional: Negative.    HENT: Negative.     Eyes: Negative.    Respiratory: Negative.     Cardiovascular: Negative.    Gastrointestinal: Negative.    Endocrine: Negative.    Genitourinary: Negative.    Musculoskeletal: Negative.    Skin: Negative.    Allergic/Immunologic: Negative.    Neurological: Negative.    Hematological: Negative.    Psychiatric/Behavioral: Negative.           I have reviewed and agree with the HPI, ROS, and historical information as entered above. Idania Brito, APRN          Objective   /60   Ht 166.4 cm (65.5\")   Wt 59.9 kg (132 lb)   LMP  (LMP Unknown) Comment: Mirena  BMI 21.63 kg/m²     Physical Exam  Vitals and nursing note reviewed. Exam conducted with a chaperone present.   Constitutional:       General: She is not in acute distress.     Appearance: Normal appearance. She is well-developed and normal weight. She is not ill-appearing.   Neck:      Thyroid: No thyroid mass or thyromegaly.   Pulmonary:      Effort: Pulmonary effort is normal. No respiratory distress or retractions.   Chest:      Chest wall: No mass.   Breasts:     Right: Normal. No mass, nipple discharge, skin change or tenderness.      Left: Normal. No mass, nipple discharge, skin change or tenderness.      Comments: Implants present bilaterally  Abdominal:      General: There is no distension.      Palpations: Abdomen is soft. Abdomen is not rigid. There is no mass.      Tenderness: There is no abdominal tenderness. There is no guarding or rebound.      Hernia: No hernia is present.   Genitourinary:     General: Normal vulva.      Exam position: Lithotomy position.      Labia:         Right: No rash, tenderness or lesion.         Left: No rash, tenderness or lesion.       Vagina: Normal. No vaginal " discharge or lesions.      Cervix: Normal. No cervical motion tenderness, discharge, friability or cervical bleeding.      Uterus: Normal. Not enlarged, not fixed and not tender.       Adnexa: Right adnexa normal and left adnexa normal.        Right: No mass or tenderness.          Left: No mass or tenderness.        Rectum: Normal. No external hemorrhoid.            Comments: IUD strings present  Musculoskeletal:      Cervical back: No muscular tenderness.   Skin:     General: Skin is warm and dry.   Neurological:      Mental Status: She is alert and oriented to person, place, and time.   Psychiatric:         Mood and Affect: Mood normal.         Behavior: Behavior normal.            Assessment and Plan    Problem List Items Addressed This Visit    None  Visit Diagnoses       Routine gynecological examination    -  Primary    Relevant Orders    LIQUID-BASED PAP SMEAR WITH HPV GENOTYPING REGARDLESS OF INTERPRETATION ([facility])    Vaginal atrophy        Relevant Medications    estradiol (ESTRACE VAGINAL) 0.1 MG/GM vaginal cream            GYN annual well woman exam.   Pap guidelines reviewed. Obtained today.   Rx refilled for estradiol, she states she had never started it but does have some dryness at times.   Mammogram and colonoscopy up to date.   Reviewed monthly self breast exams.  Instructed to call with lumps, pain, or breast discharge.    Symptoms of menopausal transition reviewed with patient.   RTC in 1 year or PRN with problems.  Return in about 1 year (around 8/23/2025) for Annual physical.     Idania Brito, RUY  08/23/2024

## 2024-08-30 LAB — REF LAB TEST METHOD: NORMAL

## 2025-01-09 ENCOUNTER — TELEPHONE (OUTPATIENT)
Dept: OBSTETRICS AND GYNECOLOGY | Facility: CLINIC | Age: 50
End: 2025-01-09
Payer: COMMERCIAL

## 2025-01-09 DIAGNOSIS — F32.A DEPRESSION, UNSPECIFIED DEPRESSION TYPE: Primary | ICD-10-CM

## 2025-01-09 NOTE — TELEPHONE ENCOUNTER
PT is needing a refill on her escitalopram (LEXAPRO) 20 MG tablet - please call in to [facility]

## 2025-01-13 RX ORDER — ESCITALOPRAM OXALATE 20 MG/1
20 TABLET ORAL DAILY
Qty: 90 TABLET | Refills: 2 | Status: SHIPPED | OUTPATIENT
Start: 2025-01-13

## 2025-04-02 ENCOUNTER — TELEPHONE (OUTPATIENT)
Dept: OBSTETRICS AND GYNECOLOGY | Facility: CLINIC | Age: 50
End: 2025-04-02
Payer: COMMERCIAL

## 2025-04-02 DIAGNOSIS — F32.A DEPRESSION, UNSPECIFIED DEPRESSION TYPE: ICD-10-CM

## 2025-04-02 RX ORDER — ESCITALOPRAM OXALATE 20 MG/1
20 TABLET ORAL DAILY
Qty: 90 TABLET | Refills: 2 | Status: SHIPPED | OUTPATIENT
Start: 2025-04-02

## 2025-04-02 NOTE — TELEPHONE ENCOUNTER
escitalopram (LEXAPRO) 20 MG tablet [[id number]] (Order [id number])   [facility]

## 2025-04-02 NOTE — TELEPHONE ENCOUNTER
Note sent to pharmacy that she had a 90 day supply and 2 refills on 1/13/25 and should not need refills at this time

## 2025-05-16 ENCOUNTER — TELEPHONE (OUTPATIENT)
Dept: OBSTETRICS AND GYNECOLOGY | Facility: CLINIC | Age: 50
End: 2025-05-16
Payer: COMMERCIAL

## 2025-05-16 NOTE — TELEPHONE ENCOUNTER
Pt called stating that she is having breakthrough bleeding with her IUD. Pt states that she is also having UTI symptoms and would like to speak with a nurse

## 2025-05-20 ENCOUNTER — TELEPHONE (OUTPATIENT)
Dept: OBSTETRICS AND GYNECOLOGY | Facility: CLINIC | Age: 50
End: 2025-05-20
Payer: COMMERCIAL

## 2025-05-20 DIAGNOSIS — Z97.5 BREAKTHROUGH BLEEDING ASSOCIATED WITH INTRAUTERINE DEVICE (IUD): Primary | ICD-10-CM

## 2025-05-20 DIAGNOSIS — N92.1 BREAKTHROUGH BLEEDING ASSOCIATED WITH INTRAUTERINE DEVICE (IUD): Primary | ICD-10-CM

## 2025-05-20 DIAGNOSIS — N94.10 FEMALE DYSPAREUNIA: ICD-10-CM

## 2025-05-20 NOTE — TELEPHONE ENCOUNTER
"Patient called 5/16/25 w/ c/o \"breakthrough bleeding with her IUD. Pt states that she is also having UTI symptoms and would like to speak with a nurse\"     Conchis has been trying to reach patient since then.     LVM for patient to CB.  "

## 2025-05-21 NOTE — TELEPHONE ENCOUNTER
Pt reports breakthrough bleeding with Mirena IUD that is more than usual. Also has had recent frequent UTI's. States IC has also been more painful. Will bring in for US to check placement of IUD and discuss further.

## 2025-05-22 ENCOUNTER — OFFICE VISIT (OUTPATIENT)
Dept: OBSTETRICS AND GYNECOLOGY | Facility: CLINIC | Age: 50
End: 2025-05-22
Payer: COMMERCIAL

## 2025-05-22 VITALS
WEIGHT: 133 LBS | SYSTOLIC BLOOD PRESSURE: 98 MMHG | DIASTOLIC BLOOD PRESSURE: 68 MMHG | BODY MASS INDEX: 21.38 KG/M2 | HEIGHT: 66 IN

## 2025-05-22 DIAGNOSIS — N95.1 PERIMENOPAUSAL SYMPTOM: Primary | ICD-10-CM

## 2025-05-22 DIAGNOSIS — N95.2 VAGINAL ATROPHY: ICD-10-CM

## 2025-05-22 RX ORDER — PROGESTERONE 100 MG/1
CAPSULE ORAL
Qty: 90 CAPSULE | Refills: 3 | Status: SHIPPED | OUTPATIENT
Start: 2025-05-22

## 2025-05-22 RX ORDER — ESTRADIOL 0.1 MG/G
CREAM VAGINAL
Qty: 42.5 G | Refills: 2 | Status: SHIPPED | OUTPATIENT
Start: 2025-05-22

## 2025-05-22 RX ORDER — ESTRADIOL 0.04 MG/D
1 PATCH, EXTENDED RELEASE TRANSDERMAL 2 TIMES WEEKLY
Qty: 8 PATCH | Refills: 12 | Status: SHIPPED | OUTPATIENT
Start: 2025-05-22 | End: 2026-05-22

## 2025-05-22 NOTE — PROGRESS NOTES
Gynecologic Exam Note      Chief Complaint   Patient presents with    Follow-up     BTB w/Mirena        Subjective   HPI  Marah Millard is a 50 y.o. female, , who presents for break through bleeding with Mirena.     The bleeding started 2 weeks ago and last only a few days without dysmenorrhea. She describes the bleeding as light and bright red. After her Mirena was placed in  her periods stopped completely.  This is the first time since the IUD was placed that she has had bleeding.     She states she has experienced this problem once.  She describes the severity as mild.  She states that the problem is annoying.  The patient reports additional symptoms as none.      She is also having VMS and decreased sleep and is interested in treatment for these issues.       Last Completed Pap Smear            Upcoming       PAP SMEAR (Every 3 Years) Next due on 2024  LIQUID-BASED PAP SMEAR WITH HPV GENOTYPING REGARDLESS OF INTERPRETATION ([facility])    2022  LIQUID-BASED PAP SMEAR, [facility] ([facility])    2021  SCANNED - PAP SMEAR    10/02/2019  Done - negative                          History of abnormal Pap smear: no    Last Completed Mammogram            Upcoming       MAMMOGRAM (Every 2 Years) Next due on 2024  MAMMO SCREENING DIGITAL TOMOSYNTHESIS BILATERAL W CAD    2023  MAMMO SCREENING DIGITAL TOMOSYNTHESIS BILATERAL W CAD    2022  SCANNED - MAMMO    2021  SCANNED - MAMMO    2021  SCANNED - MAMMO     Only the first 5 history entries have been loaded, but more history exists.                        Tobacco Usage?: No   OB History          2    Para   1    Term   0       1    AB   1    Living   1         SAB   0    IAB   0    Ectopic   0    Molar   0    Multiple   0    Live Births   1                Health Maintenance   Topic Date Due    HEPATITIS C SCREENING  Never done    ANNUAL PHYSICAL  Never  "done    COLORECTAL CANCER SCREENING  Never done    COVID-19 Vaccine (4 - 2024-25 season) 09/01/2024    INFLUENZA VACCINE  07/01/2025    Annual Gynecologic Pelvic and Breast Exam  08/24/2025    TDAP/TD VACCINES (2 - Td or Tdap) 03/22/2026    MAMMOGRAM  05/02/2026    PAP SMEAR  08/23/2027    Pneumococcal Vaccine 0-49  Aged Out       The additional following portions of the patient's history were reviewed and updated as appropriate: allergies and current medications.    Review of Systems   All other systems reviewed and are negative.    Past Medical History:   Diagnosis Date    Abnormal Pap smear of cervix     Anxiety     H/O diagnostic mammography     History of abnormal cervical Papanicolaou smear 2003    moderate dysplasia    History of sebaceous cyst     IUD (intrauterine device) in place 06/2019    Mirena     Past Surgical History:   Procedure Laterality Date    AUGMENTATION MAMMAPLASTY Bilateral 11/2017    TEXTURED; Replaced implants from 2000    BREAST CYST EXCISION      drained first by [facility], then removed by dermatologist Dr. Vazquez    BREAST SURGERY  2021    revision of implants    COLPOSCOPY W/ BIOPSY / CURETTAGE      LEEP  09/2003    moderate dysplasia; margins clear    MOLE REMOVAL      TOE SURGERY  2018    bunyom removal     URETHRAL DILATATION           I have reviewed and agree with the HPI, ROS, and historical information as entered above. Lety Davenport MD    Objective   BP 98/68   Ht 166.4 cm (65.5\")   Wt 60.3 kg (133 lb)   BMI 21.80 kg/m²     Physical Exam  Vitals and nursing note reviewed.   Constitutional:       General: She is not in acute distress.     Appearance: Normal appearance.   HENT:      Head: Normocephalic and atraumatic.   Pulmonary:      Effort: Pulmonary effort is normal. No accessory muscle usage or respiratory distress.   Neurological:      Mental Status: She is alert and oriented to person, place, and time.   Psychiatric:         Mood and Affect: Mood and affect normal.   "       Speech: Speech normal.         Assessment & Plan     Assessment     Problem List Items Addressed This Visit    None  Visit Diagnoses         Perimenopausal symptom    -  Primary    Relevant Medications    Progesterone (Prometrium) 100 MG capsule    estradiol (Vivelle-Dot) 0.0375 MG/24HR patch      Vaginal atrophy        Relevant Medications    estradiol (ESTRACE VAGINAL) 0.1 MG/GM vaginal cream              Plan     Return for Annual physical.  1 sample intrarosa.  Patient counseled that hormone treatment should be used to help with specific symptoms related to menopause such as hot flashes, night sweats and vaginal atrophy.   The lowest dose hormone that treats symptoms should be used for the shortest amount of time possible.  Although estrogen is the most effective treatment for hot flashes, other non hormonal options exist (such as Veozah, Brisdelle or venlafaxine) and should also be considered.  Anyone with an intact uterus should also receive progestogen to prevent uterine overgrowth that can lead to uterine cancer.  All hormone therapy, whether it is synthetic or bio identical, can lead to increased risk of thromboembolic diseases such as DVT, pulmonary embolism, stroke, heart attack and death.  These adverse events are more likely to develop in the first year of use and in patients who are older than the typical menopausal age.  Risks of estrogen dependent cancers such as breast cancer increase with prolonged use.  Attempts to wean hormone therapy should be discussed annually and particularly after three to five years of use.  Any side effects such as vaginal bleeding or pain should be reported immediately.    Pt likely had menses as she's had IUD in place for some time.  Discussed periods can happen with mirena.  Call if AUB.  We discussed new Mirena if bleeding bothersome/abnormal or as endometrial protection for HRT.        Lety Davenport MD  05/22/2025

## 2025-06-13 ENCOUNTER — OFFICE VISIT (OUTPATIENT)
Dept: UROLOGY | Facility: CLINIC | Age: 50
End: 2025-06-13
Payer: COMMERCIAL

## 2025-06-13 VITALS — BODY MASS INDEX: 21.38 KG/M2 | HEIGHT: 66 IN | WEIGHT: 133 LBS

## 2025-06-13 DIAGNOSIS — N30.01 ACUTE CYSTITIS WITH HEMATURIA: Primary | ICD-10-CM

## 2025-06-13 DIAGNOSIS — M54.50 ACUTE BILATERAL LOW BACK PAIN WITHOUT SCIATICA: ICD-10-CM

## 2025-06-13 DIAGNOSIS — N39.0 RECURRENT UTI (URINARY TRACT INFECTION): ICD-10-CM

## 2025-06-13 DIAGNOSIS — R30.0 DYSURIA: ICD-10-CM

## 2025-06-13 DIAGNOSIS — R31.0 GROSS HEMATURIA: ICD-10-CM

## 2025-06-13 DIAGNOSIS — R10.30 LOWER ABDOMINAL PAIN: ICD-10-CM

## 2025-06-13 LAB
BILIRUB BLD-MCNC: ABNORMAL MG/DL
CLARITY, POC: CLEAR
COLOR UR: YELLOW
EXPIRATION DATE: ABNORMAL
GLUCOSE UR STRIP-MCNC: NEGATIVE MG/DL
KETONES UR QL: NEGATIVE
LEUKOCYTE EST, POC: NEGATIVE
Lab: ABNORMAL
NITRITE UR-MCNC: NEGATIVE MG/ML
PH UR: 6.5 [PH] (ref 5–8)
PROT UR STRIP-MCNC: ABNORMAL MG/DL
RBC # UR STRIP: NEGATIVE /UL
SP GR UR: 1.01 (ref 1–1.03)
UROBILINOGEN UR QL: NORMAL

## 2025-06-13 RX ORDER — ONDANSETRON 4 MG/1
4 TABLET, FILM COATED ORAL DAILY PRN
Qty: 30 TABLET | Refills: 1 | Status: SHIPPED | OUTPATIENT
Start: 2025-06-13

## 2025-06-13 RX ORDER — METHOCARBAMOL 750 MG/1
750 TABLET, FILM COATED ORAL 3 TIMES DAILY PRN
Qty: 20 TABLET | Refills: 1 | Status: SHIPPED | OUTPATIENT
Start: 2025-06-13 | End: 2025-06-27

## 2025-06-13 RX ORDER — IBUPROFEN 800 MG/1
800 TABLET, FILM COATED ORAL EVERY 6 HOURS PRN
COMMUNITY
End: 2025-06-13 | Stop reason: SDUPTHER

## 2025-06-13 RX ORDER — PHENAZOPYRIDINE HYDROCHLORIDE 200 MG/1
200 TABLET, FILM COATED ORAL 3 TIMES DAILY PRN
Qty: 20 TABLET | Refills: 0 | Status: SHIPPED | OUTPATIENT
Start: 2025-06-13

## 2025-06-13 RX ORDER — IBUPROFEN 800 MG/1
800 TABLET, FILM COATED ORAL EVERY 8 HOURS PRN
Qty: 30 TABLET | Refills: 0 | Status: SHIPPED | OUTPATIENT
Start: 2025-06-13

## 2025-06-13 RX ORDER — NITROFURANTOIN 25; 75 MG/1; MG/1
100 CAPSULE ORAL NIGHTLY
Qty: 30 CAPSULE | Refills: 0 | Status: SHIPPED | OUTPATIENT
Start: 2025-06-13 | End: 2025-07-13

## 2025-06-13 NOTE — LETTER
June 16, 2025     Fran Viveros MD  [address]  [address]  [facility] [address]    Patient: Marah Millard   YOB: 1975   Date of Visit: 6/13/2025       Dear Fran Viveros MD,    Thank you for referring Marah Millard to me for evaluation. Below is a copy of my consult note.    If you have questions, please do not hesitate to call me. I look forward to following Marah along with you.         Sincerely,        Griselda Cheng-Akwa, APRN        CC: No Recipients    Chief Complaint  congenital urethral stenosis    Subjective    {CC  Problem List  Visit Diagnosis   Encounters  Notes  Medications  Labs  Result Review Imaging  Media :23}     History of Present Illness:  Marah Millard is a 50 y.o. female with no significant past medical history besides anxiety, who presents for evaluation with concerns of congenital urethral stenosis.  Patient reports numerous other concerns including issues ongoing with microhematuria, Recurrent UTIs since 2021 recently worsening.      She was recently evaluated by the [facility], secondary to breakthrough bleeding with IUD device/Mirena. Patient states she did have an episode of bleeding 4 weeks ago that lasted only a few days.  She describes her bleeding as light initially and then bright red. Prior to that she had no menses for several years. States it completely stopped since 2019 until recently. She is currently on HRT with Premarin.    Patient reports numerous other concerns including recurrent UTIs dating back to her childhood.  She reports urethral stricture requiring numerous urethral dilations which she outgrew.  She has not had any since her teen years.  Recently she is post numerous antibiotic therapy for acute cystitis/UTIs.  She is unsure about any positive documented bacteria.    Nevertheless she reports her infections worsened with pain in her lower back, painful urination, burning sensation with frequency, urgency,  "constant feeling of incomplete bladder emptying.  She reports flank pain, with left CVA tenderness.  Denies chills or fevers, denies N/V/D.  Urinalysis in clinic today is complete negative for bacterial infection, it is negative for gross/microscopic hematuria.  Her bladder symptom score is 15, with a PVR of 0 mL.    Objective  Vital Signs:   Ht 166.4 cm (65.5\") Comment: pt stated  Wt 60.3 kg (133 lb)   BMI 21.80 kg/m²       ROS:   Review of Systems   Constitutional:  Negative for activity change, chills, fatigue and fever.   HENT:  Negative for congestion.    Eyes:  Negative for blurred vision.   Gastrointestinal:  Negative for abdominal pain, nausea and vomiting.   Genitourinary:  Positive for dyspareunia, dysuria, flank pain, frequency, hematuria, pelvic pain, pelvic pressure and urgency. Negative for difficulty urinating, genital sores, urinary incontinence and vaginal discharge.   Musculoskeletal:  Negative for back pain.   Neurological:  Negative for dizziness, headache and confusion.   Psychiatric/Behavioral:  Negative for behavioral problems and decreased concentration.         Physical Exam  Constitutional:       General: She is not in acute distress.     Appearance: She is well-developed.   HENT:      Head: Normocephalic and atraumatic.   Eyes:      Pupils: Pupils are equal, round, and reactive to light.   Neck:      Thyroid: No thyromegaly.      Trachea: No tracheal deviation.   Cardiovascular:      Rate and Rhythm: Normal rate and regular rhythm.      Heart sounds: No murmur heard.  Pulmonary:      Effort: Pulmonary effort is normal. No respiratory distress.      Breath sounds: Normal breath sounds. No stridor. No wheezing.   Abdominal:      General: Bowel sounds are normal.      Palpations: Abdomen is soft.      Tenderness: There is no abdominal tenderness.   Genitourinary:     Labia:         Right: No tenderness.         Left: No tenderness.       Vagina: Normal. No vaginal discharge.      Comments: " Reports dysuria, frequency urgency and burning with urination.  Gross hematuria versus AUB  Musculoskeletal:         General: No deformity. Normal range of motion.      Cervical back: Normal range of motion.   Skin:     General: Skin is warm and dry.      Coloration: Skin is not pale.      Findings: No erythema or rash.   Neurological:      Mental Status: She is alert and oriented to person, place, and time.      Cranial Nerves: No cranial nerve deficit.      Sensory: No sensory deficit.      Coordination: Coordination normal.   Psychiatric:         Behavior: Behavior normal.         Thought Content: Thought content normal.         Judgment: Judgment normal.          Result Review:{ Labs  Result Review  Imaging  Med Tab  Media :23}     Urinalysis today, labs from PCP, imaging-unremarkable    RADIOLOGY (CT AND/OR KUB):    CT Abdomen and Pelvis: No results found for this or any previous visit.     CT Stone Protocol: No results found for this or any previous visit.     KUB: No results found for this or any previous visit.       LABS (3 MONTHS):    Office Visit on 06/13/2025   Component Date Value Ref Range Status   • Color 06/13/2025 Yellow  Yellow, Straw, Dark Yellow, Zara Final   • Clarity, UA 06/13/2025 Clear  Clear Final   • Specific Gravity  06/13/2025 1.015  1.005 - 1.030 Final   • pH, Urine 06/13/2025 6.5  5.0 - 8.0 Final   • Leukocytes 06/13/2025 Negative  Negative Final   • Nitrite, UA 06/13/2025 Negative  Negative Final   • Protein, POC 06/13/2025 Trace (A)  Negative mg/dL Final   • Glucose, UA 06/13/2025 Negative  Negative mg/dL Final   • Ketones, UA 06/13/2025 Negative  Negative Final   • Urobilinogen, UA 06/13/2025 Normal  Normal, 0.2 E.U./dL Final   • Bilirubin 06/13/2025 Small (1+) (A)  Negative Final   • Blood, UA 06/13/2025 Negative  Negative Final   • Lot Number 06/13/2025 [id number]   Final   • Expiration Date 06/13/2025 10/05/2026   Final      Bladder & Bowel Symptom Questionnaire    How  often do you usually urinate during the day ?   3 - About every 1-2 hours   2.   How many timed do you urinate at night?   1 - 2 times at night   3.   What is the reason that you usually urinate?   3 - Severe urge (can delay less than 10 min)   4.   Once you get the urge to go, how long can you     comfortably delay?   3 - Less than 10 min   5.   How often do you get a sudden urge that makes you rush to the bathroom?   2 - A few times a month   6.   How often does a sudden urge to urinate result in you leaking urine or wetting pads?   0 - Never   7.  In your opinion, how good is your bladder control?   2 - Good   8.  Do you have accidental bowel leakage?   no   9.  Do you have difficulty fully emptying your bladder?   yes   10.  Do you experience accidental leakage when performing some physical activity such as coughing, sneezing, laughing or exercise?   no   11. Have you tried medications to help improve your symptoms?   no   12. Would you be interested in learning about a long-lasting option that may help you with your symptoms?   no                                                                             Total Score   15     0-7 (Mild) 8-16 (Moderate) 17-28 (Severe)      Assessment and Plan {CC Problem List  Visit Diagnosis  ROS  Review (Popup)  Health Maintenance  Quality  BestPractice  Medications  SmartSets  SnapShot Encounters  Media :23}   Assessment & Plan  Acute cystitis with hematuria  Ms Marah Millard is a pleasant 50 y.o. female who presents for evaluation with concerns of congenital urethral stenosis.  Patient reports numerous other concerns including issues ongoing with AUB, Microhematuria, Recurrent UTIs since 2021 recently worsening with pain in her lower back, painful urination, burning sensation with frequency, urgency, constant feeling of incomplete bladder emptying.      On clinic evaluation today she is in apparent discomfort.  She reports LEFT>RIGHT SIDED  flank  pain, with left CVA tenderness.  She has pelvic pressure or suprapubic discomfort, but denies chills or fevers, denies N/V/D.  Urinalysis in clinic today is complete negative for bacterial infection, it is negative for gross/microscopic hematuria.  Her bladder symptom score is 15, with a PVR of 0 mL.    EDUCATION:RECURRENT UTI/ACUTE CYSTITIS /HEMATURIA/IC  FIRST, WE  discussed We discussed the types of organisms that are found in the urinary tract indicating that the vast majority are results of the patient's own gastrointestinal emil.  We discussed how many of the antibiotics that are utilized can actually exacerbate these infections by creating resistant organisms and there is only a very few antibiotics that are concentrated in the urine and do not affect the rectal reservoir nor cause recurrent yeast vaginitis. We discussed the risk factors for recurrent infections being intercourse in younger patients and atrophic changes in older patients.  We discussed the symptoms that are found including pain, pressure, burning, frequency, urgency suprapubic pain and painful intercourse.  I discussed upper tract symptoms including fevers, chills, and indicated the workup would be much more aggressive if the patient were to present with recurrent infections in the face of upper tract symptomatology such as fever. I discussed the history of vesicoureteral reflux in young patients and finally chronic renal scarring as a result of such.     Interstitial cystitis-we discussed the diagnosis and management of this condition.  I indicated that it was a multifactorial condition with multifactorial symptomatology, Including frequency, urgency, the sensation of urinary tract infection in the absence of a positive culture, sexual symptomatology including significant dyspareunia.  We discussed treatment options including the importance of making a clinical diagnosis and the cystoscopic findings including Hunner's ulcers etc.  We also  discussed medical management including pharmacologic treatment such as amitriptyline, naturopathic treatments such as pumpkin oil and which more aggressive options of Botox injections as well as the relative risks and merits of this.  We also discussed the use of dietary manipulation including the over-the-counter product relief which basically decreases the acid in the urine and avoidance of ascitic-containing foods such as citrus is etc.    Discussed AUB/Microscopic VS GROSS Hematuria with patient. She has been Asymptomatic for gross hematuria.  Reports episodes of slight bleeding which she relates to her IUD-Mirena.  However, pt educated on possible causes such as infection in the bladder, kidney, or bladder discomfort, trauma. vigorous exercise, different kinds of cancers, viral illness, such as hepatitis a virus that causes liver disease and inflammation of the liver and sexual activity.  WE Discussed the fact that there is about a 96% chance of a negative workup with episodes of microscopic hematuria and with much greater in the face of Gross hematuria.  We discussed the fact that this is a non-cumulative test.  In other words if there is hematuria next year I would recommend continuing to work up the condition because of the fact that neoplasms may be small at the first workup and easily are missed.   We discussed the fact that if there is any history of chronic kidney disease or risk factors such as diabetes for contrast a noncontrasted study will be utilized.  We will initiate an investigation.                                          PLAN  We will resend her urine for culture, I will call HER with results if any positive bacterial growth.     We discussed Starting antibiotic therapy with MACROBID if any positive bacteria    We will continue Macrobid 100 mg nightly for antibiotic suppressive therapy.      She has been encouraged to increase her p.o. fluid intake to at least 1 to 2 L daily and avoid bladder  irritants such as caffeine products, spicy foods, and citrusy foods.    She has been advised to drop of urine for recheck if symptomatic on suppressive therapy      I recommend concomitant probiotics with treatment with antibiotics to protect the rectal reservoir including over-the-counter yogurt preparations to jesus oral pills containing the appropriate probiotics.      We discussed the use of both an upper and lower tract investigation.  I discussed the fact that an upper tract investigation includes a  CT scan with contrast being the gold standard to diagnose the small neoplasms-patient has been scheduled    We discussed  lower tract investigation consisting of a cystoscopy due to positive history of AUB,-Patient has been scheduled for cystoscopy 07/10/2025 with Dr. Shipley    Discussed things she can do to help her urine frequency such as keeping the bladder diary with strict intake and output of what she eats or drinks, how often she urinates, how much she urinates.  She should follow a timed voiding bladder training program, and do Keagle exercises to strengthen the muscles to help control urination.     Follow-up in clinic as discussed, she may return sooner if need be.     Patient is agreeable plan of care.     We discussed obtaining a CT urogram for her microhematuria if it persists.     If the patient's microscopic hematuria work-up is negative, per AUA guidelines I would recommend a repeat urinalysis via the patient's primary care provider in 12 months.  If the repeat urinalysis is positive, the patient and I will then need to have a shared decision-making conversation regarding further work-up versus observation, given the low likelihood of identifying etiology in this situation.  If patient were to develop gross hematuria in the interim, the patient would need a total re-evaluation.                 Follow Up {Instructions Charge Capture  Follow-up   Orders:  •  nitrofurantoin,  macrocrystal-monohydrate, (Macrobid) 100 MG capsule; Take 1 capsule by mouth Every Night for 30 days. FOR RECURRENT UTIs/IC FLARE UP. MAY TAKE BID X 7 DAYS WHEN POSITIVE/SYMPTOMATIC  •  ondansetron (Zofran) 4 MG tablet; Take 1 tablet by mouth Daily As Needed for Nausea or Vomiting.  •  phenazopyridine (Pyridium) 200 MG tablet; Take 1 tablet by mouth 3 (Three) Times a Day As Needed for Bladder Spasms.  •  methocarbamol (ROBAXIN) 750 MG tablet; Take 1 tablet by mouth 3 (Three) Times a Day As Needed for Muscle Spasms for up to 14 days.  •  ibuprofen (ADVIL,MOTRIN) 800 MG tablet; Take 1 tablet by mouth Every 8 (Eight) Hours As Needed for Mild Pain.  •  POC Urinalysis Dipstick, Automated    Recurrent UTI (urinary tract infection)    Orders:  •  nitrofurantoin, macrocrystal-monohydrate, (Macrobid) 100 MG capsule; Take 1 capsule by mouth Every Night for 30 days. FOR RECURRENT UTIs/IC FLARE UP. MAY TAKE BID X 7 DAYS WHEN POSITIVE/SYMPTOMATIC  •  ondansetron (Zofran) 4 MG tablet; Take 1 tablet by mouth Daily As Needed for Nausea or Vomiting.  •  phenazopyridine (Pyridium) 200 MG tablet; Take 1 tablet by mouth 3 (Three) Times a Day As Needed for Bladder Spasms.  •  POC Urinalysis Dipstick, Automated    Dysuria    Orders:  •  nitrofurantoin, macrocrystal-monohydrate, (Macrobid) 100 MG capsule; Take 1 capsule by mouth Every Night for 30 days. FOR RECURRENT UTIs/IC FLARE UP. MAY TAKE BID X 7 DAYS WHEN POSITIVE/SYMPTOMATIC  •  ondansetron (Zofran) 4 MG tablet; Take 1 tablet by mouth Daily As Needed for Nausea or Vomiting.  •  phenazopyridine (Pyridium) 200 MG tablet; Take 1 tablet by mouth 3 (Three) Times a Day As Needed for Bladder Spasms.  •  methocarbamol (ROBAXIN) 750 MG tablet; Take 1 tablet by mouth 3 (Three) Times a Day As Needed for Muscle Spasms for up to 14 days.  •  ibuprofen (ADVIL,MOTRIN) 800 MG tablet; Take 1 tablet by mouth Every 8 (Eight) Hours As Needed for Mild Pain.  •  POC Urinalysis Dipstick,  Automated    Gross hematuria    Orders:  •  CT Abdomen Pelvis With & Without Contrast  •  POC Urinalysis Dipstick, Automated    Lower abdominal pain    Orders:  •  CT Abdomen Pelvis With & Without Contrast  •  methocarbamol (ROBAXIN) 750 MG tablet; Take 1 tablet by mouth 3 (Three) Times a Day As Needed for Muscle Spasms for up to 14 days.  •  ibuprofen (ADVIL,MOTRIN) 800 MG tablet; Take 1 tablet by mouth Every 8 (Eight) Hours As Needed for Mild Pain.  •  POC Urinalysis Dipstick, Automated    Acute bilateral low back pain without sciatica    Orders:  •  CT Abdomen Pelvis With & Without Contrast  •  methocarbamol (ROBAXIN) 750 MG tablet; Take 1 tablet by mouth 3 (Three) Times a Day As Needed for Muscle Spasms for up to 14 days.  •  ibuprofen (ADVIL,MOTRIN) 800 MG tablet; Take 1 tablet by mouth Every 8 (Eight) Hours As Needed for Mild Pain.  •  POC Urinalysis Dipstick, Automated      Patient reports that she is not currently experiencing any symptoms of urinary incontinence.    BMI is within normal parameters. No other follow-up for BMI required.    Smoking Cessation Counseling:  Never a smoker.  Patient does not currently use any tobacco products.     Follow Up {Instructions Charge Capture  Follow-up Communications :23}  Return in 27 days (on 7/10/2025) for Next scheduled follow up, GROSS/MICRO HEMATURIA, UTI/DYSURIA/, Review CT, Cystoscopy, DR UGARTE.    Patient was given instructions and counseling regarding her condition or for health maintenance advice. Please see specific information pulled into the AVS if appropriate.          This document has been electronically signed by Griselda Cheng-Akwa, APRN   June 16, 2025 13:49 EDT

## 2025-06-13 NOTE — PROGRESS NOTES
"Chief Complaint  congenital urethral stenosis (RECURRENT UTI/HEMATURIA/AUB)    Subjective        History of Present Illness:  Marah Millard is a 50 y.o. female with no significant past medical history besides anxiety, who presents for evaluation with concerns of congenital urethral stenosis.  Patient reports numerous other concerns including issues ongoing with microhematuria, Recurrent UTIs since 2021 recently worsening.      She was recently evaluated by the [facility], secondary to breakthrough bleeding with IUD device/Mirena. Patient states she did have an episode of bleeding 4 weeks ago that lasted only a few days.  She describes her bleeding as light initially and then bright red. Prior to that she had no menses for several years. States it completely stopped since 2019 until recently. She is currently on HRT with Premarin.    Patient reports numerous other concerns including recurrent UTIs dating back to her childhood.  She reports urethral stricture requiring numerous urethral dilations which she outgrew.  She has not had any since her teen years.  Recently she is post numerous antibiotic therapy for acute cystitis/UTIs.  She is unsure about any positive documented bacteria.    Nevertheless she reports her infections worsened with pain in her lower back, painful urination, burning sensation with frequency, urgency, constant feeling of incomplete bladder emptying.  She reports lower back pain severe, flank pain, with left CVA tenderness.  Denies chills or fevers, denies N/V/D.  Urinalysis in clinic today is complete negative for bacterial infection, it is negative for gross/microscopic hematuria.  Her bladder symptom score is 15, with a PVR of 0 mL.    Objective   Vital Signs:   Ht 166.4 cm (65.5\") Comment: pt stated  Wt 60.3 kg (133 lb)   BMI 21.80 kg/m²       ROS:   Review of Systems   Constitutional:  Negative for activity change, chills, fatigue and fever.   HENT:  Negative for congestion.  "   Eyes:  Negative for blurred vision.   Gastrointestinal:  Negative for abdominal pain, nausea and vomiting.   Genitourinary:  Positive for dyspareunia, dysuria, flank pain, frequency, hematuria, pelvic pain, pelvic pressure and urgency. Negative for difficulty urinating, genital sores, urinary incontinence and vaginal discharge.   Musculoskeletal:  Negative for back pain.   Neurological:  Negative for dizziness, headache and confusion.   Psychiatric/Behavioral:  Negative for behavioral problems and decreased concentration.         Physical Exam  Constitutional:       General: She is not in acute distress.     Appearance: She is well-developed.   HENT:      Head: Normocephalic and atraumatic.   Eyes:      Pupils: Pupils are equal, round, and reactive to light.   Neck:      Thyroid: No thyromegaly.      Trachea: No tracheal deviation.   Cardiovascular:      Rate and Rhythm: Normal rate and regular rhythm.      Heart sounds: No murmur heard.  Pulmonary:      Effort: Pulmonary effort is normal. No respiratory distress.      Breath sounds: Normal breath sounds. No stridor. No wheezing.   Abdominal:      General: Bowel sounds are normal.      Palpations: Abdomen is soft.      Tenderness: There is no abdominal tenderness.   Genitourinary:     Labia:         Right: No tenderness.         Left: No tenderness.       Vagina: Normal. No vaginal discharge.      Comments: Reports dysuria, frequency urgency and burning with urination.  Gross hematuria versus AUB  Musculoskeletal:         General: No deformity. Normal range of motion.      Cervical back: Normal range of motion.   Skin:     General: Skin is warm and dry.      Coloration: Skin is not pale.      Findings: No erythema or rash.   Neurological:      Mental Status: She is alert and oriented to person, place, and time.      Cranial Nerves: No cranial nerve deficit.      Sensory: No sensory deficit.      Coordination: Coordination normal.   Psychiatric:         Behavior:  Behavior normal.         Thought Content: Thought content normal.         Judgment: Judgment normal.          Result Review     Urinalysis today, labs from PCP, imaging-unremarkable    RADIOLOGY (CT AND/OR KUB):    CT Abdomen and Pelvis: No results found for this or any previous visit.     CT Stone Protocol: No results found for this or any previous visit.     KUB: No results found for this or any previous visit.       LABS (3 MONTHS):    Office Visit on 06/13/2025   Component Date Value Ref Range Status    Color 06/13/2025 Yellow  Yellow, Straw, Dark Yellow, Zara Final    Clarity, UA 06/13/2025 Clear  Clear Final    Specific Gravity  06/13/2025 1.015  1.005 - 1.030 Final    pH, Urine 06/13/2025 6.5  5.0 - 8.0 Final    Leukocytes 06/13/2025 Negative  Negative Final    Nitrite, UA 06/13/2025 Negative  Negative Final    Protein, POC 06/13/2025 Trace (A)  Negative mg/dL Final    Glucose, UA 06/13/2025 Negative  Negative mg/dL Final    Ketones, UA 06/13/2025 Negative  Negative Final    Urobilinogen, UA 06/13/2025 Normal  Normal, 0.2 E.U./dL Final    Bilirubin 06/13/2025 Small (1+) (A)  Negative Final    Blood, UA 06/13/2025 Negative  Negative Final    Lot Number 06/13/2025 [id number]   Final    Expiration Date 06/13/2025 10/05/2026   Final      Bladder & Bowel Symptom Questionnaire    How often do you usually urinate during the day ?   3 - About every 1-2 hours   2.   How many timed do you urinate at night?   1 - 2 times at night   3.   What is the reason that you usually urinate?   3 - Severe urge (can delay less than 10 min)   4.   Once you get the urge to go, how long can you     comfortably delay?   3 - Less than 10 min   5.   How often do you get a sudden urge that makes you rush to the bathroom?   2 - A few times a month   6.   How often does a sudden urge to urinate result in you leaking urine or wetting pads?   0 - Never   7.  In your opinion, how good is your bladder control?   2 - Good   8.  Do you have  accidental bowel leakage?   no   9.  Do you have difficulty fully emptying your bladder?   yes   10.  Do you experience accidental leakage when performing some physical activity such as coughing, sneezing, laughing or exercise?   no   11. Have you tried medications to help improve your symptoms?   no   12. Would you be interested in learning about a long-lasting option that may help you with your symptoms?   no                                                                             Total Score   15     0-7 (Mild) 8-16 (Moderate) 17-28 (Severe)      Assessment and Plan   Assessment & Plan  Acute cystitis with hematuria  Ms Marah Millard is a pleasant 50 y.o. female who presents for evaluation with concerns of congenital urethral stenosis.  Patient reports numerous other concerns including issues ongoing with AUB, Microhematuria, Recurrent UTIs since 2021 recently worsening with pain in her lower back, painful urination, burning sensation with frequency, urgency, constant feeling of incomplete bladder emptying.      On clinic evaluation today she is in apparent discomfort.  She reports LEFT>RIGHT SIDED  flank pain, with left CVA tenderness.  She has pelvic pressure or suprapubic discomfort, but denies chills or fevers, denies N/V/D.  Urinalysis in clinic today is complete negative for bacterial infection, it is negative for gross/microscopic hematuria.  Her bladder symptom score is 15, with a PVR of 0 mL.    EDUCATION:RECURRENT UTI/ACUTE CYSTITIS /HEMATURIA/IC  FIRST, WE  discussed We discussed the types of organisms that are found in the urinary tract indicating that the vast majority are results of the patient's own gastrointestinal emil.  We discussed how many of the antibiotics that are utilized can actually exacerbate these infections by creating resistant organisms and there is only a very few antibiotics that are concentrated in the urine and do not affect the rectal reservoir nor cause  recurrent yeast vaginitis. We discussed the risk factors for recurrent infections being intercourse in younger patients and atrophic changes in older patients.  We discussed the symptoms that are found including pain, pressure, burning, frequency, urgency suprapubic pain and painful intercourse.  I discussed upper tract symptoms including fevers, chills, and indicated the workup would be much more aggressive if the patient were to present with recurrent infections in the face of upper tract symptomatology such as fever. I discussed the history of vesicoureteral reflux in young patients and finally chronic renal scarring as a result of such.     Interstitial cystitis-we discussed the diagnosis and management of this condition.  I indicated that it was a multifactorial condition with multifactorial symptomatology, Including frequency, urgency, the sensation of urinary tract infection in the absence of a positive culture, sexual symptomatology including significant dyspareunia.  We discussed treatment options including the importance of making a clinical diagnosis and the cystoscopic findings including Hunner's ulcers etc.  We also discussed medical management including pharmacologic treatment such as amitriptyline, naturopathic treatments such as pumpkin oil and which more aggressive options of Botox injections as well as the relative risks and merits of this.  We also discussed the use of dietary manipulation including the over-the-counter product relief which basically decreases the acid in the urine and avoidance of ascitic-containing foods such as citrus is etc.    Discussed AUB/Microscopic VS GROSS Hematuria with patient. She has been Asymptomatic for gross hematuria.  Reports episodes of slight bleeding which she relates to her IUD-Mirena.  However, pt educated on possible causes such as infection in the bladder, kidney, or bladder discomfort, trauma. vigorous exercise, different kinds of cancers, viral illness,  such as hepatitis a virus that causes liver disease and inflammation of the liver and sexual activity.  WE Discussed the fact that there is about a 96% chance of a negative workup with episodes of microscopic hematuria and with much greater in the face of Gross hematuria.  We discussed the fact that this is a non-cumulative test.  In other words if there is hematuria next year I would recommend continuing to work up the condition because of the fact that neoplasms may be small at the first workup and easily are missed.   We discussed the fact that if there is any history of chronic kidney disease or risk factors such as diabetes for contrast a noncontrasted study will be utilized.  We will initiate an investigation.                                          PLAN  We will resend her urine for culture, I will call HER with results if any positive bacterial growth.     We discussed Starting antibiotic therapy with MACROBID if any positive bacteria    We will continue Macrobid 100 mg nightly for antibiotic suppressive therapy.      She has been encouraged to increase her p.o. fluid intake to at least 1 to 2 L daily and avoid bladder irritants such as caffeine products, spicy foods, and citrusy foods.    She has been advised to drop of urine for recheck if symptomatic on suppressive therapy      I recommend concomitant probiotics with treatment with antibiotics to protect the rectal reservoir including over-the-counter yogurt preparations to jesus oral pills containing the appropriate probiotics.      We discussed the use of both an upper and lower tract investigation.  I discussed the fact that an upper tract investigation includes a  CT scan with contrast being the gold standard to diagnose the small neoplasms-patient has been scheduled    We discussed  lower tract investigation consisting of a cystoscopy due to positive history of AUB,-Patient has been scheduled for cystoscopy 07/10/2025 with Dr. Shipley    Discussed  things she can do to help her urine frequency such as keeping the bladder diary with strict intake and output of what she eats or drinks, how often she urinates, how much she urinates.  She should follow a timed voiding bladder training program, and do Keagle exercises to strengthen the muscles to help control urination.     Follow-up in clinic as discussed, she may return sooner if need be.     Patient is agreeable plan of care.     We discussed obtaining a CT urogram for her microhematuria if it persists.     If the patient's microscopic hematuria work-up is negative, per AUA guidelines I would recommend a repeat urinalysis via the patient's primary care provider in 12 months.  If the repeat urinalysis is positive, the patient and I will then need to have a shared decision-making conversation regarding further work-up versus observation, given the low likelihood of identifying etiology in this situation.  If patient were to develop gross hematuria in the interim, the patient would need a total re-evaluation.                 Follow Up   Orders:    nitrofurantoin, macrocrystal-monohydrate, (Macrobid) 100 MG capsule; Take 1 capsule by mouth Every Night for 30 days. FOR RECURRENT UTIs/IC FLARE UP. MAY TAKE BID X 7 DAYS WHEN POSITIVE/SYMPTOMATIC    ondansetron (Zofran) 4 MG tablet; Take 1 tablet by mouth Daily As Needed for Nausea or Vomiting.    phenazopyridine (Pyridium) 200 MG tablet; Take 1 tablet by mouth 3 (Three) Times a Day As Needed for Bladder Spasms.    methocarbamol (ROBAXIN) 750 MG tablet; Take 1 tablet by mouth 3 (Three) Times a Day As Needed for Muscle Spasms for up to 14 days.    ibuprofen (ADVIL,MOTRIN) 800 MG tablet; Take 1 tablet by mouth Every 8 (Eight) Hours As Needed for Mild Pain.    POC Urinalysis Dipstick, Automated    Recurrent UTI (urinary tract infection)    Orders:    nitrofurantoin, macrocrystal-monohydrate, (Macrobid) 100 MG capsule; Take 1 capsule by mouth Every Night for 30 days. FOR  RECURRENT UTIs/IC FLARE UP. MAY TAKE BID X 7 DAYS WHEN POSITIVE/SYMPTOMATIC    ondansetron (Zofran) 4 MG tablet; Take 1 tablet by mouth Daily As Needed for Nausea or Vomiting.    phenazopyridine (Pyridium) 200 MG tablet; Take 1 tablet by mouth 3 (Three) Times a Day As Needed for Bladder Spasms.    POC Urinalysis Dipstick, Automated    Dysuria    Orders:    nitrofurantoin, macrocrystal-monohydrate, (Macrobid) 100 MG capsule; Take 1 capsule by mouth Every Night for 30 days. FOR RECURRENT UTIs/IC FLARE UP. MAY TAKE BID X 7 DAYS WHEN POSITIVE/SYMPTOMATIC    ondansetron (Zofran) 4 MG tablet; Take 1 tablet by mouth Daily As Needed for Nausea or Vomiting.    phenazopyridine (Pyridium) 200 MG tablet; Take 1 tablet by mouth 3 (Three) Times a Day As Needed for Bladder Spasms.    methocarbamol (ROBAXIN) 750 MG tablet; Take 1 tablet by mouth 3 (Three) Times a Day As Needed for Muscle Spasms for up to 14 days.    ibuprofen (ADVIL,MOTRIN) 800 MG tablet; Take 1 tablet by mouth Every 8 (Eight) Hours As Needed for Mild Pain.    POC Urinalysis Dipstick, Automated    Gross hematuria    Orders:    CT Abdomen Pelvis With & Without Contrast    POC Urinalysis Dipstick, Automated    Lower abdominal pain    Orders:    CT Abdomen Pelvis With & Without Contrast    methocarbamol (ROBAXIN) 750 MG tablet; Take 1 tablet by mouth 3 (Three) Times a Day As Needed for Muscle Spasms for up to 14 days.    ibuprofen (ADVIL,MOTRIN) 800 MG tablet; Take 1 tablet by mouth Every 8 (Eight) Hours As Needed for Mild Pain.    POC Urinalysis Dipstick, Automated    Acute bilateral low back pain without sciatica    Orders:    CT Abdomen Pelvis With & Without Contrast    methocarbamol (ROBAXIN) 750 MG tablet; Take 1 tablet by mouth 3 (Three) Times a Day As Needed for Muscle Spasms for up to 14 days.    ibuprofen (ADVIL,MOTRIN) 800 MG tablet; Take 1 tablet by mouth Every 8 (Eight) Hours As Needed for Mild Pain.    POC Urinalysis Dipstick, Automated      Patient  reports that she is not currently experiencing any symptoms of urinary incontinence.    BMI is within normal parameters. No other follow-up for BMI required.    Smoking Cessation Counseling:  Never a smoker.  Patient does not currently use any tobacco products.     Follow Up   Return in 27 days (on 7/10/2025) for Next scheduled follow up, GROSS/MICRO HEMATURIA, UTI/DYSURIA/, Review CT, Cystoscopy, DR UGARTE.    Patient was given instructions and counseling regarding her condition or for health maintenance advice. Please see specific information pulled into the AVS if appropriate.          This document has been electronically signed by Griselda Cheng-Akwa, APRN   June 16, 2025 13:58 EDT

## 2025-06-16 NOTE — PROGRESS NOTES
"Chief Complaint  congenital urethral stenosis (RECURRENT UTI/HEMATURIA/AUB)    Subjective     {CC  Problem List  Visit Diagnosis   Encounters  Notes  Medications  Labs  Result Review Imaging  Media :23}     History of Present Illness:  Marah Millard is a 50 y.o. female with no significant past medical history besides anxiety, who presents for evaluation with concerns of congenital urethral stenosis.  Patient reports numerous other concerns including issues ongoing with microhematuria, Recurrent UTIs since 2021 recently worsening.      She was recently evaluated by [facility], secondary to breakthrough bleeding with IUD device/Mirena. Patient states she did have an episode of bleeding 4 weeks ago that lasted only a few days.  She describes her bleeding as light initially and then bright red. Prior to that she had no menses for several years. States it completely stopped since 2019 until recently. She is currently on HRT with Premarin.    Patient reports numerous other concerns including recurrent UTIs dating back to her childhood.  She reports urethral stricture requiring numerous urethral dilations which she outgrew.  She has not had any since her teen years.  Recently she is post numerous antibiotic therapy for acute cystitis/UTIs.  She is unsure about any positive documented bacteria.    Nevertheless she reports her infections worsened with pain in her lower back, painful urination, burning sensation with frequency, urgency, constant feeling of incomplete bladder emptying.  She reports lower back pain severe, flank pain, with left CVA tenderness.  Denies chills or fevers, denies N/V/D.  Urinalysis in clinic today is complete negative for bacterial infection, it is negative for gross/microscopic hematuria.  Her bladder symptom score is 15, with a PVR of 0 mL.    Objective   Vital Signs:   Ht 166.4 cm (65.5\") Comment: pt stated  Wt 60.3 kg (133 lb)   BMI 21.80 kg/m²       ROS:   Review of " Systems   Constitutional:  Negative for activity change, chills, fatigue and fever.   HENT:  Negative for congestion.    Eyes:  Negative for blurred vision.   Gastrointestinal:  Negative for abdominal pain, nausea and vomiting.   Genitourinary:  Positive for dyspareunia, dysuria, flank pain, frequency, hematuria, pelvic pain, pelvic pressure and urgency. Negative for difficulty urinating, genital sores, urinary incontinence and vaginal discharge.   Musculoskeletal:  Negative for back pain.   Neurological:  Negative for dizziness, headache and confusion.   Psychiatric/Behavioral:  Negative for behavioral problems and decreased concentration.         Physical Exam  Constitutional:       General: She is not in acute distress.     Appearance: She is well-developed.   HENT:      Head: Normocephalic and atraumatic.   Eyes:      Pupils: Pupils are equal, round, and reactive to light.   Neck:      Thyroid: No thyromegaly.      Trachea: No tracheal deviation.   Cardiovascular:      Rate and Rhythm: Normal rate and regular rhythm.      Heart sounds: No murmur heard.  Pulmonary:      Effort: Pulmonary effort is normal. No respiratory distress.      Breath sounds: Normal breath sounds. No stridor. No wheezing.   Abdominal:      General: Bowel sounds are normal.      Palpations: Abdomen is soft.      Tenderness: There is no abdominal tenderness.   Genitourinary:     Labia:         Right: No tenderness.         Left: No tenderness.       Vagina: Normal. No vaginal discharge.      Comments: Reports dysuria, frequency urgency and burning with urination.  Gross hematuria versus AUB  Musculoskeletal:         General: No deformity. Normal range of motion.      Cervical back: Normal range of motion.   Skin:     General: Skin is warm and dry.      Coloration: Skin is not pale.      Findings: No erythema or rash.   Neurological:      Mental Status: She is alert and oriented to person, place, and time.      Cranial Nerves: No cranial  nerve deficit.      Sensory: No sensory deficit.      Coordination: Coordination normal.   Psychiatric:         Behavior: Behavior normal.         Thought Content: Thought content normal.         Judgment: Judgment normal.          Result Review :{ Labs  Result Review  Imaging  Med Tab  Media :23}     Urinalysis today, labs from PCP, imaging-unremarkable    RADIOLOGY (CT AND/OR KUB):    CT Abdomen and Pelvis: No results found for this or any previous visit.     CT Stone Protocol: No results found for this or any previous visit.     KUB: No results found for this or any previous visit.       LABS (3 MONTHS):    Office Visit on 06/13/2025   Component Date Value Ref Range Status    Color 06/13/2025 Yellow  Yellow, Straw, Dark Yellow, Zara Final    Clarity, UA 06/13/2025 Clear  Clear Final    Specific Gravity  06/13/2025 1.015  1.005 - 1.030 Final    pH, Urine 06/13/2025 6.5  5.0 - 8.0 Final    Leukocytes 06/13/2025 Negative  Negative Final    Nitrite, UA 06/13/2025 Negative  Negative Final    Protein, POC 06/13/2025 Trace (A)  Negative mg/dL Final    Glucose, UA 06/13/2025 Negative  Negative mg/dL Final    Ketones, UA 06/13/2025 Negative  Negative Final    Urobilinogen, UA 06/13/2025 Normal  Normal, 0.2 E.U./dL Final    Bilirubin 06/13/2025 Small (1+) (A)  Negative Final    Blood, UA 06/13/2025 Negative  Negative Final    Lot Number 06/13/2025 [id number]   Final    Expiration Date 06/13/2025 10/05/2026   Final      Bladder & Bowel Symptom Questionnaire    How often do you usually urinate during the day ?   3 - About every 1-2 hours   2.   How many timed do you urinate at night?   1 - 2 times at night   3.   What is the reason that you usually urinate?   3 - Severe urge (can delay less than 10 min)   4.   Once you get the urge to go, how long can you     comfortably delay?   3 - Less than 10 min   5.   How often do you get a sudden urge that makes you rush to the bathroom?   2 - A few times a month   6.   How  often does a sudden urge to urinate result in you leaking urine or wetting pads?   0 - Never   7.  In your opinion, how good is your bladder control?   2 - Good   8.  Do you have accidental bowel leakage?   no   9.  Do you have difficulty fully emptying your bladder?   yes   10.  Do you experience accidental leakage when performing some physical activity such as coughing, sneezing, laughing or exercise?   no   11. Have you tried medications to help improve your symptoms?   no   12. Would you be interested in learning about a long-lasting option that may help you with your symptoms?   no                                                                             Total Score   15     0-7 (Mild) 8-16 (Moderate) 17-28 (Severe)      Assessment and Plan {CC Problem List  Visit Diagnosis  ROS  Review (Popup)  Health Maintenance  Quality  BestPractice  Medications  SmartSets  SnapShot Encounters  Media :23}   Assessment & Plan  Acute cystitis with hematuria    Orders:    nitrofurantoin, macrocrystal-monohydrate, (Macrobid) 100 MG capsule; Take 1 capsule by mouth Every Night for 30 days. FOR RECURRENT UTIs/IC FLARE UP. MAY TAKE BID X 7 DAYS WHEN POSITIVE/SYMPTOMATIC    ondansetron (Zofran) 4 MG tablet; Take 1 tablet by mouth Daily As Needed for Nausea or Vomiting.    phenazopyridine (Pyridium) 200 MG tablet; Take 1 tablet by mouth 3 (Three) Times a Day As Needed for Bladder Spasms.    methocarbamol (ROBAXIN) 750 MG tablet; Take 1 tablet by mouth 3 (Three) Times a Day As Needed for Muscle Spasms for up to 14 days.    ibuprofen (ADVIL,MOTRIN) 800 MG tablet; Take 1 tablet by mouth Every 8 (Eight) Hours As Needed for Mild Pain.    POC Urinalysis Dipstick, Automated    Recurrent UTI (urinary tract infection)    Orders:    nitrofurantoin, macrocrystal-monohydrate, (Macrobid) 100 MG capsule; Take 1 capsule by mouth Every Night for 30 days. FOR RECURRENT UTIs/IC FLARE UP. MAY TAKE BID X 7 DAYS WHEN POSITIVE/SYMPTOMATIC     ondansetron (Zofran) 4 MG tablet; Take 1 tablet by mouth Daily As Needed for Nausea or Vomiting.    phenazopyridine (Pyridium) 200 MG tablet; Take 1 tablet by mouth 3 (Three) Times a Day As Needed for Bladder Spasms.    POC Urinalysis Dipstick, Automated    Dysuria    Orders:    nitrofurantoin, macrocrystal-monohydrate, (Macrobid) 100 MG capsule; Take 1 capsule by mouth Every Night for 30 days. FOR RECURRENT UTIs/IC FLARE UP. MAY TAKE BID X 7 DAYS WHEN POSITIVE/SYMPTOMATIC    ondansetron (Zofran) 4 MG tablet; Take 1 tablet by mouth Daily As Needed for Nausea or Vomiting.    phenazopyridine (Pyridium) 200 MG tablet; Take 1 tablet by mouth 3 (Three) Times a Day As Needed for Bladder Spasms.    methocarbamol (ROBAXIN) 750 MG tablet; Take 1 tablet by mouth 3 (Three) Times a Day As Needed for Muscle Spasms for up to 14 days.    ibuprofen (ADVIL,MOTRIN) 800 MG tablet; Take 1 tablet by mouth Every 8 (Eight) Hours As Needed for Mild Pain.    POC Urinalysis Dipstick, Automated    Gross hematuria    Orders:    CT Abdomen Pelvis With & Without Contrast    POC Urinalysis Dipstick, Automated    Lower abdominal pain    Orders:    CT Abdomen Pelvis With & Without Contrast    methocarbamol (ROBAXIN) 750 MG tablet; Take 1 tablet by mouth 3 (Three) Times a Day As Needed for Muscle Spasms for up to 14 days.    ibuprofen (ADVIL,MOTRIN) 800 MG tablet; Take 1 tablet by mouth Every 8 (Eight) Hours As Needed for Mild Pain.    POC Urinalysis Dipstick, Automated    Acute bilateral low back pain without sciatica    Orders:    CT Abdomen Pelvis With & Without Contrast    methocarbamol (ROBAXIN) 750 MG tablet; Take 1 tablet by mouth 3 (Three) Times a Day As Needed for Muscle Spasms for up to 14 days.    ibuprofen (ADVIL,MOTRIN) 800 MG tablet; Take 1 tablet by mouth Every 8 (Eight) Hours As Needed for Mild Pain.    POC Urinalysis Dipstick, Automated      Patient reports that she is not currently experiencing any symptoms of urinary  incontinence.    BMI is within normal parameters. No other follow-up for BMI required.    Smoking Cessation Counseling:  Never a smoker.  Patient does not currently use any tobacco products.     Follow Up {Instructions Charge Capture  Follow-up Communications :23}  Return in 27 days (on 7/10/2025) for Next scheduled follow up, GROSS/MICRO HEMATURIA, UTI/DYSURIA/, Review CT, Cystoscopy, DR UGARTE.    Patient was given instructions and counseling regarding her condition or for health maintenance advice. Please see specific information pulled into the AVS if appropriate.          This document has been electronically signed by Griselda Cheng-Akwa, APRN   June 16, 2025 14:05 EDT

## 2025-06-16 NOTE — PROGRESS NOTES
"Chief Complaint  congenital urethral stenosis (RECURRENT UTI/HEMATURIA/AUB)    Subjective     {CC  Problem List  Visit Diagnosis   Encounters  Notes  Medications  Labs  Result Review Imaging  Media :23}     History of Present Illness:  Marah Millard is a 50 y.o. female with no significant past medical history besides anxiety, who presents for evaluation with concerns of congenital urethral stenosis.  Patient reports numerous other concerns including issues ongoing with microhematuria, Recurrent UTIs since 2021 recently worsening.      She was recently evaluated by [facility], secondary to breakthrough bleeding with IUD device/Mirena. Patient states she did have an episode of bleeding 4 weeks ago that lasted only a few days.  She describes her bleeding as light initially and then bright red. Prior to that she had no menses for several years. States it completely stopped since 2019 until recently. She is currently on HRT with Premarin.    Patient reports numerous other concerns including recurrent UTIs dating back to her childhood.  She reports urethral stricture requiring numerous urethral dilations which she outgrew.  She has not had any since her teen years.  Recently she is post numerous antibiotic therapy for acute cystitis/UTIs.  She is unsure about any positive documented bacteria.    Nevertheless she reports her infections worsened with pain in her lower back, painful urination, burning sensation with frequency, urgency, constant feeling of incomplete bladder emptying.  She reports lower back pain severe, flank pain, with left CVA tenderness.  Denies chills or fevers, denies N/V/D.  Urinalysis in clinic today is complete negative for bacterial infection, it is negative for gross/microscopic hematuria.  Her bladder symptom score is 15, with a PVR of 0 mL.    Objective   Vital Signs:   Ht 166.4 cm (65.5\") Comment: pt stated  Wt 60.3 kg (133 lb)   BMI 21.80 kg/m²       ROS:   Review of " Systems   Constitutional:  Negative for activity change, chills, fatigue and fever.   HENT:  Negative for congestion.    Eyes:  Negative for blurred vision.   Gastrointestinal:  Negative for abdominal pain, nausea and vomiting.   Genitourinary:  Positive for dyspareunia, dysuria, flank pain, frequency, hematuria, pelvic pain, pelvic pressure and urgency. Negative for difficulty urinating, genital sores, urinary incontinence and vaginal discharge.   Musculoskeletal:  Negative for back pain.   Neurological:  Negative for dizziness, headache and confusion.   Psychiatric/Behavioral:  Negative for behavioral problems and decreased concentration.         Physical Exam  Constitutional:       General: She is not in acute distress.     Appearance: She is well-developed.   HENT:      Head: Normocephalic and atraumatic.   Eyes:      Pupils: Pupils are equal, round, and reactive to light.   Neck:      Thyroid: No thyromegaly.      Trachea: No tracheal deviation.   Cardiovascular:      Rate and Rhythm: Normal rate and regular rhythm.      Heart sounds: No murmur heard.  Pulmonary:      Effort: Pulmonary effort is normal. No respiratory distress.      Breath sounds: Normal breath sounds. No stridor. No wheezing.   Abdominal:      General: Bowel sounds are normal.      Palpations: Abdomen is soft.      Tenderness: There is no abdominal tenderness.   Genitourinary:     Labia:         Right: No tenderness.         Left: No tenderness.       Vagina: Normal. No vaginal discharge.      Comments: Reports dysuria, frequency urgency and burning with urination.  Gross hematuria versus AUB  Musculoskeletal:         General: No deformity. Normal range of motion.      Cervical back: Normal range of motion.   Skin:     General: Skin is warm and dry.      Coloration: Skin is not pale.      Findings: No erythema or rash.   Neurological:      Mental Status: She is alert and oriented to person, place, and time.      Cranial Nerves: No cranial  nerve deficit.      Sensory: No sensory deficit.      Coordination: Coordination normal.   Psychiatric:         Behavior: Behavior normal.         Thought Content: Thought content normal.         Judgment: Judgment normal.          Result Review :{ Labs  Result Review  Imaging  Med Tab  Media :23}     Urinalysis today, labs from PCP, imaging-unremarkable    RADIOLOGY (CT AND/OR KUB):    CT Abdomen and Pelvis: No results found for this or any previous visit.     CT Stone Protocol: No results found for this or any previous visit.     KUB: No results found for this or any previous visit.       LABS (3 MONTHS):    Office Visit on 06/13/2025   Component Date Value Ref Range Status    Color 06/13/2025 Yellow  Yellow, Straw, Dark Yellow, Zara Final    Clarity, UA 06/13/2025 Clear  Clear Final    Specific Gravity  06/13/2025 1.015  1.005 - 1.030 Final    pH, Urine 06/13/2025 6.5  5.0 - 8.0 Final    Leukocytes 06/13/2025 Negative  Negative Final    Nitrite, UA 06/13/2025 Negative  Negative Final    Protein, POC 06/13/2025 Trace (A)  Negative mg/dL Final    Glucose, UA 06/13/2025 Negative  Negative mg/dL Final    Ketones, UA 06/13/2025 Negative  Negative Final    Urobilinogen, UA 06/13/2025 Normal  Normal, 0.2 E.U./dL Final    Bilirubin 06/13/2025 Small (1+) (A)  Negative Final    Blood, UA 06/13/2025 Negative  Negative Final    Lot Number 06/13/2025 [id number]   Final    Expiration Date 06/13/2025 10/05/2026   Final      Bladder & Bowel Symptom Questionnaire    How often do you usually urinate during the day ?   3 - About every 1-2 hours   2.   How many timed do you urinate at night?   1 - 2 times at night   3.   What is the reason that you usually urinate?   3 - Severe urge (can delay less than 10 min)   4.   Once you get the urge to go, how long can you     comfortably delay?   3 - Less than 10 min   5.   How often do you get a sudden urge that makes you rush to the bathroom?   2 - A few times a month   6.   How  often does a sudden urge to urinate result in you leaking urine or wetting pads?   0 - Never   7.  In your opinion, how good is your bladder control?   2 - Good   8.  Do you have accidental bowel leakage?   no   9.  Do you have difficulty fully emptying your bladder?   yes   10.  Do you experience accidental leakage when performing some physical activity such as coughing, sneezing, laughing or exercise?   no   11. Have you tried medications to help improve your symptoms?   no   12. Would you be interested in learning about a long-lasting option that may help you with your symptoms?   no                                                                             Total Score   15     0-7 (Mild) 8-16 (Moderate) 17-28 (Severe)      Assessment and Plan {CC Problem List  Visit Diagnosis  ROS  Review (Popup)  Health Maintenance  Quality  BestPractice  Medications  SmartSets  SnapShot Encounters  Media :23}   Assessment & Plan  Acute cystitis with hematuria    Orders:    nitrofurantoin, macrocrystal-monohydrate, (Macrobid) 100 MG capsule; Take 1 capsule by mouth Every Night for 30 days. FOR RECURRENT UTIs/IC FLARE UP. MAY TAKE BID X 7 DAYS WHEN POSITIVE/SYMPTOMATIC    ondansetron (Zofran) 4 MG tablet; Take 1 tablet by mouth Daily As Needed for Nausea or Vomiting.    phenazopyridine (Pyridium) 200 MG tablet; Take 1 tablet by mouth 3 (Three) Times a Day As Needed for Bladder Spasms.    methocarbamol (ROBAXIN) 750 MG tablet; Take 1 tablet by mouth 3 (Three) Times a Day As Needed for Muscle Spasms for up to 14 days.    ibuprofen (ADVIL,MOTRIN) 800 MG tablet; Take 1 tablet by mouth Every 8 (Eight) Hours As Needed for Mild Pain.    POC Urinalysis Dipstick, Automated    Recurrent UTI (urinary tract infection)    Orders:    nitrofurantoin, macrocrystal-monohydrate, (Macrobid) 100 MG capsule; Take 1 capsule by mouth Every Night for 30 days. FOR RECURRENT UTIs/IC FLARE UP. MAY TAKE BID X 7 DAYS WHEN POSITIVE/SYMPTOMATIC     ondansetron (Zofran) 4 MG tablet; Take 1 tablet by mouth Daily As Needed for Nausea or Vomiting.    phenazopyridine (Pyridium) 200 MG tablet; Take 1 tablet by mouth 3 (Three) Times a Day As Needed for Bladder Spasms.    POC Urinalysis Dipstick, Automated    Dysuria    Orders:    nitrofurantoin, macrocrystal-monohydrate, (Macrobid) 100 MG capsule; Take 1 capsule by mouth Every Night for 30 days. FOR RECURRENT UTIs/IC FLARE UP. MAY TAKE BID X 7 DAYS WHEN POSITIVE/SYMPTOMATIC    ondansetron (Zofran) 4 MG tablet; Take 1 tablet by mouth Daily As Needed for Nausea or Vomiting.    phenazopyridine (Pyridium) 200 MG tablet; Take 1 tablet by mouth 3 (Three) Times a Day As Needed for Bladder Spasms.    methocarbamol (ROBAXIN) 750 MG tablet; Take 1 tablet by mouth 3 (Three) Times a Day As Needed for Muscle Spasms for up to 14 days.    ibuprofen (ADVIL,MOTRIN) 800 MG tablet; Take 1 tablet by mouth Every 8 (Eight) Hours As Needed for Mild Pain.    POC Urinalysis Dipstick, Automated    Gross hematuria    Orders:    CT Abdomen Pelvis With & Without Contrast    POC Urinalysis Dipstick, Automated    Lower abdominal pain    Orders:    CT Abdomen Pelvis With & Without Contrast    methocarbamol (ROBAXIN) 750 MG tablet; Take 1 tablet by mouth 3 (Three) Times a Day As Needed for Muscle Spasms for up to 14 days.    ibuprofen (ADVIL,MOTRIN) 800 MG tablet; Take 1 tablet by mouth Every 8 (Eight) Hours As Needed for Mild Pain.    POC Urinalysis Dipstick, Automated    Acute bilateral low back pain without sciatica    Orders:    CT Abdomen Pelvis With & Without Contrast    methocarbamol (ROBAXIN) 750 MG tablet; Take 1 tablet by mouth 3 (Three) Times a Day As Needed for Muscle Spasms for up to 14 days.    ibuprofen (ADVIL,MOTRIN) 800 MG tablet; Take 1 tablet by mouth Every 8 (Eight) Hours As Needed for Mild Pain.    POC Urinalysis Dipstick, Automated      Patient reports that she is not currently experiencing any symptoms of urinary  incontinence.    BMI is within normal parameters. No other follow-up for BMI required.    Smoking Cessation Counseling:  Never a smoker.  Patient does not currently use any tobacco products.     Follow Up {Instructions Charge Capture  Follow-up Communications :23}  Return in 27 days (on 7/10/2025) for Next scheduled follow up, GROSS/MICRO HEMATURIA, UTI/DYSURIA/, Review CT, Cystoscopy, DR UGARTE.    Patient was given instructions and counseling regarding her condition or for health maintenance advice. Please see specific information pulled into the AVS if appropriate.          This document has been electronically signed by Griselda Cheng-Akwa, APRN   June 16, 2025 14:14 EDT

## 2025-06-22 ENCOUNTER — HOSPITAL ENCOUNTER (OUTPATIENT)
Facility: HOSPITAL | Age: 50
Discharge: HOME | End: 2025-06-22
Admitting: NURSE PRACTITIONER
Payer: COMMERCIAL

## 2025-06-22 PROCEDURE — 25510000001 IOPAMIDOL PER 1 ML: Performed by: NURSE PRACTITIONER

## 2025-06-22 PROCEDURE — 74178 CT ABD&PLV WO CNTR FLWD CNTR: CPT

## 2025-06-22 RX ORDER — IOPAMIDOL 755 MG/ML
150 INJECTION, SOLUTION INTRAVASCULAR
Status: COMPLETED | OUTPATIENT
Start: 2025-06-22 | End: 2025-06-22

## 2025-06-22 RX ADMIN — IOPAMIDOL 150 ML: 755 INJECTION, SOLUTION INTRAVENOUS at 13:42

## 2025-07-11 DIAGNOSIS — N30.01 ACUTE CYSTITIS WITH HEMATURIA: ICD-10-CM

## 2025-07-11 DIAGNOSIS — N39.0 RECURRENT UTI (URINARY TRACT INFECTION): ICD-10-CM

## 2025-07-11 DIAGNOSIS — R30.0 DYSURIA: ICD-10-CM

## 2025-07-11 RX ORDER — NITROFURANTOIN 25; 75 MG/1; MG/1
CAPSULE ORAL
Qty: 30 CAPSULE | Refills: 0 | Status: SHIPPED | OUTPATIENT
Start: 2025-07-11

## 2025-07-11 NOTE — TELEPHONE ENCOUNTER
Rx Refill Note  Requested Prescriptions     Pending Prescriptions Disp Refills    nitrofurantoin, macrocrystal-monohydrate, (MACROBID) 100 MG capsule [Pharmacy Med Name: NITROFURANTOIN MONO- MG] 30 capsule 0     Sig: PLEASE SEE ATTACHED FOR DETAILED DIRECTIONS      Last office visit with prescribing clinician: 6/13/2025   Next office visit with prescribing clinician: 07/14/2025      Ana Cool MA  07/11/25, 07:40 EDT

## 2025-07-14 ENCOUNTER — PROCEDURE VISIT (OUTPATIENT)
Dept: UROLOGY | Facility: CLINIC | Age: 50
End: 2025-07-14
Payer: COMMERCIAL

## 2025-07-14 DIAGNOSIS — R30.0 DYSURIA: Primary | ICD-10-CM

## 2025-07-14 DIAGNOSIS — M62.89 PELVIC FLOOR DYSFUNCTION IN FEMALE: ICD-10-CM

## 2025-07-14 LAB
BILIRUB BLD-MCNC: NEGATIVE MG/DL
CLARITY, POC: CLEAR
COLOR UR: YELLOW
EXPIRATION DATE: ABNORMAL
GLUCOSE UR STRIP-MCNC: NEGATIVE MG/DL
KETONES UR QL: ABNORMAL
LEUKOCYTE EST, POC: NEGATIVE
Lab: ABNORMAL
NITRITE UR-MCNC: NEGATIVE MG/ML
PH UR: 6.5 [PH] (ref 5–8)
PROT UR STRIP-MCNC: NEGATIVE MG/DL
RBC # UR STRIP: NEGATIVE /UL
SP GR UR: 1.01 (ref 1–1.03)
UROBILINOGEN UR QL: ABNORMAL

## 2025-07-14 PROCEDURE — 81003 URINALYSIS AUTO W/O SCOPE: CPT | Performed by: UROLOGY

## 2025-07-14 PROCEDURE — 52000 CYSTOURETHROSCOPY: CPT | Performed by: UROLOGY

## 2025-07-14 NOTE — PROGRESS NOTES
Preprocedure diagnosis  Hematuria    Postprocedure diagnosis  Same    Procedure  Flexible Cystourethroscopy    Attending surgeon  Janice Shipley MD    Anesthesia  2% lidocaine jelly intraurethrally    Complications  None    Indications  50 y.o. female undergoing a flexible cystoscopy for the above mentioned indications.      Informed consent was obtained prior to the procedure start.       Findings  Cystoscopy revealed normal bladder mucosa with NO tumors, masses, stones or trabeculations noted.      Procedure  The patient was placed in supine position and prepped and draped in sterile fashion with lidocaine jelly instilled 5 minutes pre-procedure start.  A brief timeout including available nursing staff and awake patient was performed.  The 16 Fr digital flexible cystoscope was lubricated and gently placed into the urethral meatus. The proximal and distal portions of the urethra appeared well vascularized and normal in appearance. The bladder neck was visualized and appeared well vascularized without mucosal lesions or abnormal appearance. The bladder was then entered and  completely visualized including the trigone. There were bilateral orthotopic ureteral orifices which appeared patent and effluxed clear yellow urine. The posterior wall, lateral walls, anterior wall, and dome were visualized. The cystoscope was then retroflexed and the bladder neck was further visualized and appeared normal.  The scope was gently withdrawn and the procedure terminated.  The patient tolerated the procedure well.       A/P: Ms. Millard is a 50-year-old female who presents with history of hematuria.  She underwent CT urogram which did not reveal any concerning findings.  Her cystoscopy today was negative.  She also reports mild dyspareunia and feeling of incomplete emptying.  She is using Estrace cream.  I discussed pelvic floor dysfunction pelvic floor physical therapy.  I will send a referral in for her.  I will have her  follow-up with us in 3 months for symptom check.

## 2025-07-15 ENCOUNTER — TELEPHONE (OUTPATIENT)
Dept: OBSTETRICS AND GYNECOLOGY | Facility: CLINIC | Age: 50
End: 2025-07-15
Payer: COMMERCIAL

## 2025-07-15 DIAGNOSIS — N95.1 PERIMENOPAUSAL SYMPTOM: ICD-10-CM

## 2025-07-15 RX ORDER — PROGESTERONE 100 MG/1
CAPSULE ORAL
Qty: 60 CAPSULE | Refills: 0 | Status: SHIPPED | OUTPATIENT
Start: 2025-07-15

## 2025-07-15 RX ORDER — ESTRADIOL 0.04 MG/D
1 PATCH, EXTENDED RELEASE TRANSDERMAL 2 TIMES WEEKLY
Qty: 16 PATCH | Refills: 0 | Status: SHIPPED | OUTPATIENT
Start: 2025-07-17

## 2025-08-12 ENCOUNTER — TELEPHONE (OUTPATIENT)
Dept: OBSTETRICS AND GYNECOLOGY | Facility: CLINIC | Age: 50
End: 2025-08-12
Payer: COMMERCIAL

## 2025-08-12 DIAGNOSIS — N95.1 PERIMENOPAUSAL SYMPTOM: ICD-10-CM

## 2025-08-12 RX ORDER — ESTRADIOL 0.04 MG/D
1 PATCH, EXTENDED RELEASE TRANSDERMAL 2 TIMES WEEKLY
Qty: 8 PATCH | Refills: 0 | Status: SHIPPED | OUTPATIENT
Start: 2025-08-14

## 2025-08-13 ENCOUNTER — TELEPHONE (OUTPATIENT)
Dept: OBSTETRICS AND GYNECOLOGY | Facility: CLINIC | Age: 50
End: 2025-08-13
Payer: COMMERCIAL

## 2025-08-29 ENCOUNTER — OFFICE VISIT (OUTPATIENT)
Dept: OBSTETRICS AND GYNECOLOGY | Facility: CLINIC | Age: 50
End: 2025-08-29
Payer: COMMERCIAL

## 2025-08-29 VITALS
SYSTOLIC BLOOD PRESSURE: 100 MMHG | BODY MASS INDEX: 22.31 KG/M2 | WEIGHT: 138.8 LBS | HEIGHT: 66 IN | DIASTOLIC BLOOD PRESSURE: 70 MMHG

## 2025-08-29 DIAGNOSIS — Z12.31 SCREENING MAMMOGRAM FOR BREAST CANCER: ICD-10-CM

## 2025-08-29 DIAGNOSIS — Z97.5 BREAKTHROUGH BLEEDING ASSOCIATED WITH INTRAUTERINE DEVICE (IUD): ICD-10-CM

## 2025-08-29 DIAGNOSIS — N92.1 BREAKTHROUGH BLEEDING ASSOCIATED WITH INTRAUTERINE DEVICE (IUD): ICD-10-CM

## 2025-08-29 DIAGNOSIS — N95.2 VAGINAL ATROPHY: ICD-10-CM

## 2025-08-29 DIAGNOSIS — N95.1 PERIMENOPAUSAL SYMPTOM: ICD-10-CM

## 2025-08-29 DIAGNOSIS — Z01.419 WOMEN'S ANNUAL ROUTINE GYNECOLOGICAL EXAMINATION: Primary | ICD-10-CM

## 2025-08-29 RX ORDER — PROGESTERONE 100 MG/1
CAPSULE ORAL
Qty: 60 CAPSULE | Refills: 0 | Status: SHIPPED | OUTPATIENT
Start: 2025-08-29

## 2025-08-29 RX ORDER — ESTRADIOL 0.1 MG/G
CREAM VAGINAL
Qty: 42.5 G | Refills: 2 | Status: SHIPPED | OUTPATIENT
Start: 2025-08-29

## 2025-08-29 RX ORDER — LOVASTATIN 40 MG/1
1 TABLET ORAL 2 TIMES WEEKLY
Qty: 8 PATCH | Refills: 0 | Status: SHIPPED | OUTPATIENT
Start: 2025-09-01